# Patient Record
Sex: FEMALE | Race: WHITE | Employment: FULL TIME | URBAN - METROPOLITAN AREA
[De-identification: names, ages, dates, MRNs, and addresses within clinical notes are randomized per-mention and may not be internally consistent; named-entity substitution may affect disease eponyms.]

---

## 2018-07-02 PROBLEM — F32.81 PRE-MENSTRUAL MOOD DISORDER: Status: ACTIVE | Noted: 2018-07-02

## 2019-01-09 ENCOUNTER — HOSPITAL ENCOUNTER (OUTPATIENT)
Dept: GENERAL RADIOLOGY | Age: 39
Discharge: HOME | End: 2019-01-09
Payer: COMMERCIAL

## 2019-01-09 DIAGNOSIS — M54.5 LOW BACK PAIN, UNSPECIFIED BACK PAIN LATERALITY, UNSPECIFIED CHRONICITY, WITH SCIATICA PRESENCE UNSPECIFIED: ICD-10-CM

## 2019-01-09 DIAGNOSIS — M54.2 NECK PAIN: ICD-10-CM

## 2019-01-09 PROCEDURE — 72040 X-RAY EXAM NECK SPINE 2-3 VW: CPT

## 2019-01-09 PROCEDURE — 72100 X-RAY EXAM L-S SPINE 2/3 VWS: CPT

## 2019-01-10 NOTE — PROGRESS NOTES
Further imaging is indicated and orthopedic evaluation due to concern about symptoms. Treatment for spondylolysis is what we are already doing along with avoiding high impact activities. See telephone visit for details about these test results.

## 2020-01-20 PROBLEM — N83.201 BILATERAL OVARIAN CYSTS: Status: ACTIVE | Noted: 2020-01-20

## 2020-01-20 PROBLEM — N83.202 BILATERAL OVARIAN CYSTS: Status: ACTIVE | Noted: 2020-01-20

## 2020-01-20 PROBLEM — M47.812 SPONDYLOSIS OF CERVICAL REGION WITHOUT MYELOPATHY OR RADICULOPATHY: Status: ACTIVE | Noted: 2020-01-20

## 2020-12-03 ENCOUNTER — TRANSCRIBE ORDER (OUTPATIENT)
Dept: SCHEDULING | Age: 40
End: 2020-12-03

## 2020-12-03 DIAGNOSIS — Z12.31 SCREENING MAMMOGRAM, ENCOUNTER FOR: Primary | ICD-10-CM

## 2020-12-22 ENCOUNTER — HOSPITAL ENCOUNTER (OUTPATIENT)
Dept: MAMMOGRAPHY | Age: 40
Discharge: HOME | End: 2020-12-22
Attending: OBSTETRICS & GYNECOLOGY
Payer: COMMERCIAL

## 2020-12-22 DIAGNOSIS — Z12.31 SCREENING MAMMOGRAM, ENCOUNTER FOR: ICD-10-CM

## 2020-12-22 PROCEDURE — 77067 SCR MAMMO BI INCL CAD: CPT

## 2020-12-30 ENCOUNTER — HOSPITAL ENCOUNTER (OUTPATIENT)
Dept: MAMMOGRAPHY | Age: 40
Discharge: HOME | End: 2020-12-30
Attending: OBSTETRICS & GYNECOLOGY
Payer: COMMERCIAL

## 2020-12-30 DIAGNOSIS — R92.8 ABNORMAL SCREENING MAMMOGRAM: ICD-10-CM

## 2020-12-30 PROCEDURE — 76642 ULTRASOUND BREAST LIMITED: CPT

## 2020-12-30 PROCEDURE — 77065 DX MAMMO INCL CAD UNI: CPT

## 2021-01-06 ENCOUNTER — HOSPITAL ENCOUNTER (OUTPATIENT)
Dept: MRI IMAGING | Age: 41
Discharge: HOME | End: 2021-01-06
Attending: OBSTETRICS & GYNECOLOGY
Payer: COMMERCIAL

## 2021-01-06 DIAGNOSIS — Z80.3 FAMILY HISTORY OF BREAST CANCER: ICD-10-CM

## 2021-01-06 DIAGNOSIS — R92.8 ABNORMAL MAMMOGRAM: ICD-10-CM

## 2021-01-06 DIAGNOSIS — D24.9 FIBROADENOMA OF BREAST: ICD-10-CM

## 2021-01-06 DIAGNOSIS — R92.8 ABNORMAL ULTRASOUND OF BREAST: ICD-10-CM

## 2021-01-06 PROCEDURE — 74011250636 HC RX REV CODE- 250/636

## 2021-01-06 PROCEDURE — A9575 INJ GADOTERATE MEGLUMI 0.1ML: HCPCS

## 2021-01-06 PROCEDURE — C8937 CAD BREAST MRI: HCPCS

## 2021-01-06 RX ORDER — GADOTERATE MEGLUMINE 376.9 MG/ML
10 INJECTION INTRAVENOUS
Status: COMPLETED | OUTPATIENT
Start: 2021-01-06 | End: 2021-01-06

## 2021-01-06 RX ORDER — SODIUM CHLORIDE 0.9 % (FLUSH) 0.9 %
10 SYRINGE (ML) INJECTION
Status: COMPLETED | OUTPATIENT
Start: 2021-01-06 | End: 2021-01-06

## 2021-01-06 RX ADMIN — GADOTERATE MEGLUMINE 10 ML: 376.9 INJECTION INTRAVENOUS at 11:45

## 2021-01-06 RX ADMIN — Medication 10 ML: at 11:45

## 2021-01-07 ENCOUNTER — HOSPITAL ENCOUNTER (OUTPATIENT)
Dept: MAMMOGRAPHY | Age: 41
Discharge: HOME | End: 2021-01-07
Payer: COMMERCIAL

## 2021-01-07 VITALS — SYSTOLIC BLOOD PRESSURE: 118 MMHG | HEART RATE: 101 BPM | DIASTOLIC BLOOD PRESSURE: 76 MMHG

## 2021-01-07 DIAGNOSIS — R92.8 ABNORMAL MAMMOGRAM OF RIGHT BREAST: ICD-10-CM

## 2021-01-07 DIAGNOSIS — R93.89 ABNORMAL MRI: ICD-10-CM

## 2021-01-07 PROCEDURE — 77065 DX MAMMO INCL CAD UNI: CPT

## 2021-01-07 PROCEDURE — A4648 IMPLANTABLE TISSUE MARKER: HCPCS

## 2021-01-07 PROCEDURE — 74011000250 HC RX REV CODE- 250

## 2021-01-07 PROCEDURE — 88305 TISSUE EXAM BY PATHOLOGIST: CPT

## 2021-01-07 RX ORDER — LIDOCAINE HYDROCHLORIDE 10 MG/ML
9 INJECTION INFILTRATION; PERINEURAL
Status: COMPLETED | OUTPATIENT
Start: 2021-01-07 | End: 2021-01-07

## 2021-01-07 RX ADMIN — LIDOCAINE HYDROCHLORIDE 9 ML: 10 INJECTION, SOLUTION INFILTRATION; PERINEURAL at 08:47

## 2021-01-18 NOTE — H&P (VIEW-ONLY)
Amina Briceno, DO 
[address], [address] [facility] Phone [phone]   Fax [phone] Date of visit: 2021 Primary/Requesting provider: Jocy Flanagan MD 
 
Chief Complaint Patient presents with  New Patient  
  right breast papilloma Name: Della Crowder MRN: [id number] : 1980 Age: 38 y.o. Sex: female PCP: Jocy Flanagan MD  
 
 
CC:   
Chief Complaint Patient presents with  New Patient  
  right breast papilloma HPI: 
 
 Della Crowder is a 38 y.o. female who presents for evaluation of right breast intraductal papilloma. This is a healthy 79-year-old female with a biopsy proven right breast intraductal papilloma. The patient had breast MRI which revealed suspicious abnormality of the right breast leading to biopsy and diagnosis. She has had 2 previous lumpectomies in the right breast for fibroadenoma both performed through a areolar incision. She denies any previous personal history of breast cancer. She has family history of breast cancer in her mother. She is here today to discuss intraductal papilloma and possible surgery. Merlin Delacruz DIAGNOSIS  
A: \" RIGHT BREAST, 12:00 POSITION, 6 CM FROM NIPPLE, CORE BIOPSY\":  
FIBROCYSTIC MASTOPATHY HAVING INTRADUCTAL PAPILLOMA, MODERATE INTRADUCTAL HYPERPLASIA, APOCRINE METAPLASIA AND MICROCALCIFICATIONS  
jtl/2021 Electronically signed out on 2021 06:24 by CESAR Godoy M.D.  
 
[facility], 2021. CLINICAL HISTORY:  Abnormal mammogram and ultrasound. History of right breast 
fibroadenoma with indicated surgical removal in the patient's current provided 
breast surgical history. 
  
Technique: Multiplanar multisequence imaging of the breast were performed prior 
to and following the uneventful intravenous administration of 10 mL of Dotarem. Postcontrast imaging was performed using a dynamic technique. Images were 
reviewed using the [employer] software package.  
  
Sequences obtained: Axial T1 with and without fat saturation, axial STIR, and 
dynamic axial postcontrast fat-saturated T1-weighted images. Additional 
subtraction images, maximum intensity projected images, and sagittal 
reconstructed images were made from postcontrast images. 
  
Comparison studies: Bilateral mammogram 12/22/2020, and right breast diagnostic 
mammogram and ultrasound 12/30/2020. FINDINGS:  
The breasts are composed of marked fibroglandular elements. No clearly 
concerning axillary lymph node is seen. No enlarged internal mammary lymph nodes 
are seen. Evaluation of the lungs is limited by  MRI imaging. However, no 
obvious pulmonary masses are seen. No significant pleural effusions are seen. The liver is obscured by cardiac motion artifact and only partially visualized. However, no focal signal abnormalities are seen prior to, or following 
administration of contrast to suggest a possible hepatic lesion. Following the administration of intravenous contrast, normal enhancement is seen 
of the heart and vascular structures of the breasts. Severe background 
physiologic enhancement is seen in the bilateral breasts which could limit 
detection particularly of a more subtle process. Patient has a known abnormality 
in the upper right breast. Corresponding changes are seen at the 12:00 position 
of the right breast located 6 cm from the nipple. At this level, there is 
masslike enhancement which measures 1.4 cm wide by 0.8 cm AP by 1.4 cm 
craniocaudal. This is an irregular enhancing mass with spiculated margins and 
demonstrates predominantly plateau kinetics. A malignant lesion cannot be 
excluded given these features. No worrisome dominant enhancing mass or 
asymmetric nonmass-like enhancement is otherwise seen. No evidence of skin thickening, or nipple retraction is seen. No enhancing osseous lesion is seen. 
  
IMPRESSION IMPRESSION: 
1.  1.4 cm x 0.8 cm x 1.4 cm enhancing mass corresponding to abnormal mammogram 
changes located at the 12:00 position of the right breast located 6 cm from the 
nipple which demonstrates multiple concerning features by breast MRI with a 
malignant lesion not excluded. Further evaluation with second look ultrasound, 
and ultrasound-guided biopsy is recommended. 
  
BI-RADS Assessment Category 4: Suspicious Finding- Biopsy should be considered. 
  
PMH: 
 
Past Medical History:  
Diagnosis Date  Allergic rhinitis 10/24/2016  Anxiety  Bilateral ovarian cysts 2020  Chronic superficial gastritis without bleeding 2016 - EGD - gastritis moderate gastritis in the body -biopsied. Dr. Saray Sanches  Claustrophobia  Gastroesophageal reflux disease 10/24/2016  
 Hiatal hernia   
 per pt report found as a child  Pre-menstrual mood disorder 2018  Spondylosis of cervical region without myelopathy or radiculopathy 2020 MRI cervical spine  - POA showed some degenerative disc disease and osteophytes with mild/moderate spinal stenosis PSH: 
 
Past Surgical History:  
Procedure Laterality Date  HX BREAST BIOPSY Right 2021  HX BREAST LUMPECTOMY Right   
 fibroadenoma  HX  SECTION    
 2  
 
 
MEDS: 
 
Current Outpatient Medications Medication Sig  
 lansoprazole (Prevacid) 30 mg capsule Take 1 Cap by mouth Daily (before breakfast). Replaces famotidine  calcium carbonate (TUMS PO) Take  by mouth.  acetaminophen (TylenoL) 325 mg tablet Take  by mouth every four (4) hours as needed for Pain.  OTHER Marshmallow Root PRN  
 nystatin (MYCOSTATIN) topical cream Apply  to affected area two (2) times a day.  sodium chloride (OCEAN) 0.65 % nasal squeeze bottle 0.05 mL by Both Nostrils route four (4) times daily as needed for Congestion.  cyclobenzaprine (FLEXERIL) 10 mg tablet Take 1 Tab by mouth three (3) times daily as needed for Muscle Spasm(s). No current facility-administered medications for this visit. ALLERGIES:   
 
Allergies Allergen Reactions  Penicillin G Other (comments) As a child and has taken as an adult without difficulty  Sulfa (Sulfonamide Antibiotics) Other (comments) Hives. Face and throat swelling SH: Social History Tobacco Use  Smoking status: Former Smoker Packs/day: 0.50 Start date:  Quit date:  Years since quittin.0  Smokeless tobacco: Never Used Substance Use Topics  Alcohol use: Yes Comment: occasional  
 Drug use: No  
 
 
FH: 
 
Family History Problem Relation Age of Onset  Breast Cancer Mother  Kidney Disease Mother  Hypertension Mother  Asthma Mother  Neuropathy Mother  Cancer Mother   
     renal cell  Pacemaker Father  Hypertension Father  Cancer Father  Other Sister  Thyroid Disease Sister  Drug Abuse Brother  Asthma Brother  Glaucoma Maternal Grandmother  Cancer Maternal Grandmother  Hypertension Maternal Grandmother  Colon Cancer Maternal Grandfather  Stroke Paternal Grandmother  Heart Disease Paternal Grandmother  Heart Disease Paternal Grandfather Review of Systems Constitutional: Negative. HENT: Negative. Eyes: Negative. Respiratory: Negative. Cardiovascular: Negative. Gastrointestinal: Negative. Genitourinary: Negative. Musculoskeletal: Negative. Skin:  
     Right breast papilloma Neurological: Negative. Endo/Heme/Allergies: Negative. Psychiatric/Behavioral: Negative. Physical Exam:  
 
Visit Vitals /80 Pulse 88 Ht 5' 1\" (1.549 m) Wt 125 lb (56.7 kg) LMP 01/08/2021 BMI 23.62 kg/m² Physical Exam 
HENT:  
   Head: Normocephalic and atraumatic. Eyes:  
   Pupils: Pupils are equal, round, and reactive to light. Neck: Musculoskeletal: Normal range of motion and neck supple. Thyroid: No thyromegaly. Trachea: No tracheal deviation. Cardiovascular:  
   Rate and Rhythm: Normal rate and regular rhythm. Heart sounds: Normal heart sounds. No murmur. Pulmonary:  
   Effort: Pulmonary effort is normal. No respiratory distress. Breath sounds: Normal breath sounds. No wheezing or rales. Chest:  
 
 
Abdominal:  
   General: Bowel sounds are normal. There is no distension. Palpations: Abdomen is soft. There is no mass. Tenderness: There is no abdominal tenderness. There is no guarding or rebound. Musculoskeletal: Normal range of motion. Skin: 
   General: Skin is warm and dry. Findings: No erythema. Neurological:  
   Mental Status: She is alert and oriented to person, place, and time. Psychiatric:     
   Mood and Affect: Mood normal.     
   Judgment: Judgment normal.  
 
 
 
Assessment/Plan:  Jess Ngo is a 38 y.o. female who has signs and symptoms consistent with right breast intraductal papilloma. Today recommended needle localization partial mastectomy for margins. She will be scheduled the next available date and time. ICD-10-CM ICD-9-CM 1. Intraductal papilloma of breast, right  D24.1 217 I went through the risks of bleeding, infection and anesthesia. Counseling time:counseling time more than 50% of visit: 1 hour was utilized in office visit today 50% times in face-to-face discussion explaining intraductal papilloma. We discussed the anatomy of the breast followed by intraductal papilloma and how they commonly present. We discussed the rationale for partial mastectomy after diagnosis of intraductal papilloma to rule out papillary carcinoma. We discussed the details of the procedure the postoperative expectations and recovery time.  
 
Signed: Abdifatah Alegria DO  
1/18/2021  9:30 AM

## 2021-01-19 ENCOUNTER — HOSPITAL ENCOUNTER (OUTPATIENT)
Dept: SURGERY | Age: 41
Discharge: HOME | End: 2021-01-19

## 2021-01-21 VITALS — BODY MASS INDEX: 22.82 KG/M2 | WEIGHT: 124 LBS | HEIGHT: 62 IN

## 2021-01-21 RX ORDER — MELATONIN
DAILY
COMMUNITY

## 2021-01-21 RX ORDER — MULTIVIT WITH MINERALS/HERBS
1 TABLET ORAL DAILY
COMMUNITY

## 2021-01-21 NOTE — PERIOP NOTES
Patient verified name and . Order for consent NOT found in EHR; patient verifies procedure. Type 1B surgery, PAT PHONE assessment complete. Orders NOT received. Labs per surgeon: No orders received at this time. Labs per anesthesia protocol: None. Patient COVID test negative on 21. Patient answered medical/surgical history questions at their best of ability. All prior to admission medications documented in [facility]. Patient instructed to take the following medications the day of surgery according to anesthesia guidelines with a small sip of water: Prevacid and Flexeril PRN. Hold all vitamins, supplements, herbals, NSAIDs, and ASA stop NOW. Patient instructed on the following:      > 1 medical  is allowed at this time. > Arrive at [facility], time of arrival to be called the day before by 1700  > NPO after midnight including gum, mints, and ice chips  > Responsible adult must drive patient to the hospital, stay during surgery, and patient will need supervision 24 hours after anesthesia  > Use anti-bacterial soap in shower the night before surgery and on the morning of surgery  > All piercings must be removed prior to arrival.    > Leave all valuables (money and jewelry) at home but bring insurance card and ID on DOS.   > Do not wear make-up, nail polish, lotions, cologne, perfumes, powders, or oil on skin.

## 2021-01-24 ENCOUNTER — ANESTHESIA EVENT (OUTPATIENT)
Dept: SURGERY | Age: 41
End: 2021-01-24
Payer: COMMERCIAL

## 2021-01-25 ENCOUNTER — APPOINTMENT (OUTPATIENT)
Dept: MAMMOGRAPHY | Age: 41
End: 2021-01-25
Attending: SURGERY
Payer: COMMERCIAL

## 2021-01-25 ENCOUNTER — ANESTHESIA (OUTPATIENT)
Dept: SURGERY | Age: 41
End: 2021-01-25
Payer: COMMERCIAL

## 2021-01-25 ENCOUNTER — HOSPITAL ENCOUNTER (OUTPATIENT)
Age: 41
Setting detail: OUTPATIENT SURGERY
Discharge: HOME | End: 2021-01-25
Attending: SURGERY | Admitting: SURGERY
Payer: COMMERCIAL

## 2021-01-25 VITALS
BODY MASS INDEX: 22.65 KG/M2 | SYSTOLIC BLOOD PRESSURE: 109 MMHG | HEART RATE: 103 BPM | RESPIRATION RATE: 16 BRPM | DIASTOLIC BLOOD PRESSURE: 70 MMHG | TEMPERATURE: 98.4 F | HEIGHT: 62 IN | OXYGEN SATURATION: 98 % | WEIGHT: 123.1 LBS

## 2021-01-25 DIAGNOSIS — N63.10 MASS OF RIGHT BREAST: ICD-10-CM

## 2021-01-25 DIAGNOSIS — D24.1 INTRADUCTAL PAPILLOMA OF BREAST, RIGHT: ICD-10-CM

## 2021-01-25 LAB — HCG UR QL: NEGATIVE

## 2021-01-25 PROCEDURE — 74011000250 HC RX REV CODE- 250: Performed by: SURGERY

## 2021-01-25 PROCEDURE — 77030041445 HC PENCL ELECT SMK EVAC STRY -B: Performed by: SURGERY

## 2021-01-25 PROCEDURE — 74011250636 HC RX REV CODE- 250/636: Performed by: SURGERY

## 2021-01-25 PROCEDURE — 77030002933 HC SUT MCRYL J&J -A: Performed by: SURGERY

## 2021-01-25 PROCEDURE — 74011250637 HC RX REV CODE- 250/637: Performed by: ANESTHESIOLOGY

## 2021-01-25 PROCEDURE — 77030040922 HC BLNKT HYPOTHRM STRY -A: Performed by: ANESTHESIOLOGY

## 2021-01-25 PROCEDURE — 77030039425 HC BLD LARYNG TRULITE DISP TELE -A: Performed by: ANESTHESIOLOGY

## 2021-01-25 PROCEDURE — 77030040361 HC SLV COMPR DVT MDII -B: Performed by: SURGERY

## 2021-01-25 PROCEDURE — 74011250636 HC RX REV CODE- 250/636: Performed by: ANESTHESIOLOGY

## 2021-01-25 PROCEDURE — 2709999900 HC NON-CHARGEABLE SUPPLY: Performed by: SURGERY

## 2021-01-25 PROCEDURE — 77065 DX MAMMO INCL CAD UNI: CPT

## 2021-01-25 PROCEDURE — 77030031139 HC SUT VCRL2 J&J -A: Performed by: SURGERY

## 2021-01-25 PROCEDURE — 81025 URINE PREGNANCY TEST: CPT

## 2021-01-25 PROCEDURE — 76060000032 HC ANESTHESIA 0.5 TO 1 HR: Performed by: SURGERY

## 2021-01-25 PROCEDURE — 76210000020 HC REC RM PH II FIRST 0.5 HR: Performed by: SURGERY

## 2021-01-25 PROCEDURE — 77030037088 HC TUBE ENDOTRACH ORAL NSL COVD-A: Performed by: ANESTHESIOLOGY

## 2021-01-25 PROCEDURE — 74011000250 HC RX REV CODE- 250: Performed by: NURSE ANESTHETIST, CERTIFIED REGISTERED

## 2021-01-25 PROCEDURE — 76210000006 HC OR PH I REC 0.5 TO 1 HR: Performed by: SURGERY

## 2021-01-25 PROCEDURE — 76010000160 HC OR TIME 0.5 TO 1 HR INTENSV-TIER 1: Performed by: SURGERY

## 2021-01-25 PROCEDURE — 19301 PARTIAL MASTECTOMY: CPT | Performed by: SURGERY

## 2021-01-25 PROCEDURE — 74011250636 HC RX REV CODE- 250/636: Performed by: NURSE ANESTHETIST, CERTIFIED REGISTERED

## 2021-01-25 PROCEDURE — 19285 PERQ DEV BREAST 1ST US IMAG: CPT

## 2021-01-25 PROCEDURE — 88307 TISSUE EXAM BY PATHOLOGIST: CPT

## 2021-01-25 PROCEDURE — 77030002996 HC SUT SLK J&J -A: Performed by: SURGERY

## 2021-01-25 PROCEDURE — 77030010512 HC APPL CLP LIG J&J -C: Performed by: SURGERY

## 2021-01-25 RX ORDER — ROCURONIUM BROMIDE 10 MG/ML
INJECTION, SOLUTION INTRAVENOUS AS NEEDED
Status: DISCONTINUED | OUTPATIENT
Start: 2021-01-25 | End: 2021-01-25 | Stop reason: HOSPADM

## 2021-01-25 RX ORDER — LIDOCAINE HYDROCHLORIDE 10 MG/ML
5 INJECTION INFILTRATION; PERINEURAL
Status: COMPLETED | OUTPATIENT
Start: 2021-01-25 | End: 2021-01-25

## 2021-01-25 RX ORDER — SODIUM CHLORIDE, SODIUM LACTATE, POTASSIUM CHLORIDE, CALCIUM CHLORIDE 600; 310; 30; 20 MG/100ML; MG/100ML; MG/100ML; MG/100ML
75 INJECTION, SOLUTION INTRAVENOUS CONTINUOUS
Status: DISCONTINUED | OUTPATIENT
Start: 2021-01-25 | End: 2021-01-25 | Stop reason: HOSPADM

## 2021-01-25 RX ORDER — FENTANYL CITRATE 50 UG/ML
INJECTION, SOLUTION INTRAMUSCULAR; INTRAVENOUS AS NEEDED
Status: DISCONTINUED | OUTPATIENT
Start: 2021-01-25 | End: 2021-01-25 | Stop reason: HOSPADM

## 2021-01-25 RX ORDER — OXYCODONE AND ACETAMINOPHEN 5; 325 MG/1; MG/1
1 TABLET ORAL
Qty: 20 TAB | Refills: 0 | Status: SHIPPED | OUTPATIENT
Start: 2021-01-25 | End: 2021-01-30

## 2021-01-25 RX ORDER — PROPOFOL 10 MG/ML
INJECTION, EMULSION INTRAVENOUS AS NEEDED
Status: DISCONTINUED | OUTPATIENT
Start: 2021-01-25 | End: 2021-01-25 | Stop reason: HOSPADM

## 2021-01-25 RX ORDER — MIDAZOLAM HYDROCHLORIDE 1 MG/ML
2 INJECTION, SOLUTION INTRAMUSCULAR; INTRAVENOUS ONCE
Status: COMPLETED | OUTPATIENT
Start: 2021-01-25 | End: 2021-01-25

## 2021-01-25 RX ORDER — OXYCODONE HYDROCHLORIDE 5 MG/1
5 TABLET ORAL
Status: DISCONTINUED | OUTPATIENT
Start: 2021-01-25 | End: 2021-01-25 | Stop reason: HOSPADM

## 2021-01-25 RX ORDER — SUCCINYLCHOLINE CHLORIDE 20 MG/ML
INJECTION INTRAMUSCULAR; INTRAVENOUS AS NEEDED
Status: DISCONTINUED | OUTPATIENT
Start: 2021-01-25 | End: 2021-01-25 | Stop reason: HOSPADM

## 2021-01-25 RX ORDER — HYDROMORPHONE HYDROCHLORIDE 2 MG/ML
0.5 INJECTION, SOLUTION INTRAMUSCULAR; INTRAVENOUS; SUBCUTANEOUS
Status: DISCONTINUED | OUTPATIENT
Start: 2021-01-25 | End: 2021-01-25 | Stop reason: HOSPADM

## 2021-01-25 RX ORDER — ACETAMINOPHEN 500 MG
1000 TABLET ORAL ONCE
Status: COMPLETED | OUTPATIENT
Start: 2021-01-25 | End: 2021-01-25

## 2021-01-25 RX ORDER — LIDOCAINE HYDROCHLORIDE 20 MG/ML
INJECTION, SOLUTION EPIDURAL; INFILTRATION; INTRACAUDAL; PERINEURAL AS NEEDED
Status: DISCONTINUED | OUTPATIENT
Start: 2021-01-25 | End: 2021-01-25 | Stop reason: HOSPADM

## 2021-01-25 RX ORDER — LIDOCAINE HYDROCHLORIDE AND EPINEPHRINE 10; 10 MG/ML; UG/ML
INJECTION, SOLUTION INFILTRATION; PERINEURAL AS NEEDED
Status: DISCONTINUED | OUTPATIENT
Start: 2021-01-25 | End: 2021-01-25 | Stop reason: HOSPADM

## 2021-01-25 RX ORDER — OXYCODONE AND ACETAMINOPHEN 10; 325 MG/1; MG/1
1 TABLET ORAL AS NEEDED
Status: DISCONTINUED | OUTPATIENT
Start: 2021-01-25 | End: 2021-01-25 | Stop reason: HOSPADM

## 2021-01-25 RX ORDER — DEXAMETHASONE SODIUM PHOSPHATE 4 MG/ML
INJECTION, SOLUTION INTRA-ARTICULAR; INTRALESIONAL; INTRAMUSCULAR; INTRAVENOUS; SOFT TISSUE AS NEEDED
Status: DISCONTINUED | OUTPATIENT
Start: 2021-01-25 | End: 2021-01-25 | Stop reason: HOSPADM

## 2021-01-25 RX ORDER — KETOROLAC TROMETHAMINE 30 MG/ML
30 INJECTION, SOLUTION INTRAMUSCULAR; INTRAVENOUS
Status: COMPLETED | OUTPATIENT
Start: 2021-01-25 | End: 2021-01-25

## 2021-01-25 RX ORDER — SODIUM CHLORIDE 0.9 % (FLUSH) 0.9 %
5-40 SYRINGE (ML) INJECTION AS NEEDED
Status: DISCONTINUED | OUTPATIENT
Start: 2021-01-25 | End: 2021-01-25 | Stop reason: HOSPADM

## 2021-01-25 RX ORDER — ONDANSETRON 2 MG/ML
INJECTION INTRAMUSCULAR; INTRAVENOUS AS NEEDED
Status: DISCONTINUED | OUTPATIENT
Start: 2021-01-25 | End: 2021-01-25 | Stop reason: HOSPADM

## 2021-01-25 RX ORDER — SODIUM CHLORIDE 0.9 % (FLUSH) 0.9 %
5-40 SYRINGE (ML) INJECTION EVERY 8 HOURS
Status: DISCONTINUED | OUTPATIENT
Start: 2021-01-25 | End: 2021-01-25 | Stop reason: HOSPADM

## 2021-01-25 RX ORDER — CLINDAMYCIN PHOSPHATE 900 MG/50ML
900 INJECTION INTRAVENOUS ONCE
Status: COMPLETED | OUTPATIENT
Start: 2021-01-25 | End: 2021-01-25

## 2021-01-25 RX ADMIN — ONDANSETRON 4 MG: 2 INJECTION INTRAMUSCULAR; INTRAVENOUS at 13:32

## 2021-01-25 RX ADMIN — MIDAZOLAM 2 MG: 1 INJECTION INTRAMUSCULAR; INTRAVENOUS at 12:55

## 2021-01-25 RX ADMIN — SUCCINYLCHOLINE CHLORIDE 120 MG: 20 INJECTION, SOLUTION INTRAMUSCULAR; INTRAVENOUS at 13:09

## 2021-01-25 RX ADMIN — ROCURONIUM BROMIDE 5 MG: 10 INJECTION, SOLUTION INTRAVENOUS at 13:09

## 2021-01-25 RX ADMIN — DEXAMETHASONE SODIUM PHOSPHATE 8 MG: 4 INJECTION, SOLUTION INTRAMUSCULAR; INTRAVENOUS at 13:32

## 2021-01-25 RX ADMIN — KETOROLAC TROMETHAMINE 30 MG: 30 INJECTION, SOLUTION INTRAMUSCULAR at 14:26

## 2021-01-25 RX ADMIN — ACETAMINOPHEN 1000 MG: 500 TABLET ORAL at 09:55

## 2021-01-25 RX ADMIN — SODIUM CHLORIDE, SODIUM LACTATE, POTASSIUM CHLORIDE, AND CALCIUM CHLORIDE: 600; 310; 30; 20 INJECTION, SOLUTION INTRAVENOUS at 13:00

## 2021-01-25 RX ADMIN — SODIUM CHLORIDE, SODIUM LACTATE, POTASSIUM CHLORIDE, AND CALCIUM CHLORIDE: 600; 310; 30; 20 INJECTION, SOLUTION INTRAVENOUS at 13:27

## 2021-01-25 RX ADMIN — FENTANYL CITRATE 100 MCG: 50 INJECTION INTRAMUSCULAR; INTRAVENOUS at 13:09

## 2021-01-25 RX ADMIN — CLINDAMYCIN PHOSPHATE 900 MG: 900 INJECTION, SOLUTION INTRAVENOUS at 13:00

## 2021-01-25 RX ADMIN — LIDOCAINE HYDROCHLORIDE 100 MG: 20 INJECTION, SOLUTION EPIDURAL; INFILTRATION; INTRACAUDAL; PERINEURAL at 13:09

## 2021-01-25 RX ADMIN — SODIUM CHLORIDE, SODIUM LACTATE, POTASSIUM CHLORIDE, AND CALCIUM CHLORIDE 75 ML/HR: 600; 310; 30; 20 INJECTION, SOLUTION INTRAVENOUS at 09:49

## 2021-01-25 RX ADMIN — PROPOFOL 200 MG: 10 INJECTION, EMULSION INTRAVENOUS at 13:09

## 2021-01-25 RX ADMIN — LIDOCAINE HYDROCHLORIDE 5 ML: 10 INJECTION, SOLUTION INFILTRATION; PERINEURAL at 10:44

## 2021-01-25 NOTE — ANESTHESIA PREPROCEDURE EVALUATION
Relevant Problems   NEUROLOGY   (+) Pre-menstrual mood disorder      GASTROINTESTINAL   (+) Gastroesophageal reflux disease       Anesthetic History   No history of anesthetic complications            Review of Systems / Medical History  Patient summary reviewed and pertinent labs reviewed    Pulmonary  Within defined limits                 Neuro/Psych         Neuromuscular disease and psychiatric history    Comments: Mild cervical spondylosis on MRI Cardiovascular                  Exercise tolerance: >4 METS     GI/Hepatic/Renal     GERD: well controlled      PUD     Endo/Other        Arthritis     Other Findings            Physical Exam    Airway  Mallampati: II  TM Distance: > 6 cm  Neck ROM: normal range of motion   Mouth opening: Normal     Cardiovascular    Rhythm: regular           Dental  No notable dental hx       Pulmonary                 Abdominal  GI exam deferred       Other Findings            Anesthetic Plan    ASA: 2  Anesthesia type: general          Induction: Intravenous  Anesthetic plan and risks discussed with: Patient

## 2021-01-25 NOTE — ANESTHESIA POSTPROCEDURE EVALUATION
Procedure(s):  RIGHT BREAST PARTIAL MASTECTOMY  RIGHT BREAST NEEDLE LOCALIZED BIOPSY PREOP 0830/ LOC 1000/ SURGERY 1200.     general    Anesthesia Post Evaluation      Multimodal analgesia: multimodal analgesia not used between 6 hours prior to anesthesia start to PACU discharge  Patient location during evaluation: PACU  Patient participation: complete - patient participated  Level of consciousness: awake  Pain management: adequate  Airway patency: patent  Anesthetic complications: no  Cardiovascular status: acceptable  Respiratory status: acceptable  Hydration status: acceptable  Post anesthesia nausea and vomiting:  none  Final Post Anesthesia Temperature Assessment:  Normothermia (36.0-37.5 degrees C)      INITIAL Post-op Vital signs:   Vitals Value Taken Time   /70 01/25/21 1425   Temp 36.9 °C (98.4 °F) 01/25/21 1358   Pulse 103 01/25/21 1425   Resp 16 01/25/21 1425   SpO2 98 % 01/25/21 1425

## 2021-01-25 NOTE — DISCHARGE INSTRUCTIONS
Post op instructions:  1. May shower only, no tub bathing, no hot tub, no swimming. 2. Keep incision clean with regular soap and water. 3. No lifting over 10 lbs. 4. Regular diet as tolerated. 5. May remove topical dressing on Day #2. Leave steri strips until seen in Dr. Alegria's office at follow up appointment. 6. No driving on pain medicines. 7. Resume home medicines as usual.   Esperanza Jones, DO      After general anesthesia or intravenous sedation, for 24 hours or while taking prescription Narcotics:  · Limit your activities  · A responsible adult needs to be with you for the next 24 hours  · Do not drive and operate hazardous machinery  · Do not make important personal or business decisions  · Do not drink alcoholic beverages  · If you have not urinated within 8 hours after discharge, please contact your surgeon on call. · If you have sleep apnea and have a CPAP machine, please use it for all naps and sleeping. · Please use caution when taking narcotics and any of your home medications that may cause drowsiness. *  Please give a list of your current medications to your Primary Care Provider. *  Please update this list whenever your medications are discontinued, doses are      changed, or new medications (including over-the-counter products) are added. *  Please carry medication information at all times in case of emergency situations. These are general instructions for a healthy lifestyle:  No smoking/ No tobacco products/ Avoid exposure to second hand smoke  Surgeon General's Warning:  Quitting smoking now greatly reduces serious risk to your health.   Obesity, smoking, and sedentary lifestyle greatly increases your risk for illness  A healthy diet, regular physical exercise & weight monitoring are important for maintaining a healthy lifestyle    You may be retaining fluid if you have a history of heart failure or if you experience any of the following symptoms:  Weight gain of 3 pounds or more overnight or 5 pounds in a week, increased swelling in our hands or feet or shortness of breath while lying flat in bed. Please call your doctor as soon as you notice any of these symptoms; do not wait until your next office visit.

## 2021-01-25 NOTE — INTERVAL H&P NOTE
Update History & Physical    The Patient's History and Physical of January 18, 2021 was reviewed with the patient and I examined the patient. There was no change. The surgical site was confirmed by the patient and me. Plan:  The risk, benefits, expected outcome, and alternative to the recommended procedure have been discussed with the patient. Patient understands and wants to proceed with the procedure.     Electronically signed by Miguelangel Kerr DO on 1/25/2021 at 12:30 PM

## 2021-01-25 NOTE — BRIEF OP NOTE
Brief Postoperative Note    Patient: Viviana Anne  YOB: 1980  MRN: [id number]    Date of Procedure: 1/25/2021     Pre-Op Diagnosis: Intraductal papilloma of breast, right [D24.1]    Post-Op Diagnosis: Same as preoperative diagnosis. Procedure(s):  RIGHT BREAST NEEDLE LOCALIZED PARTIAL MASTECTOMY CPT 96729      Surgeon(s):  Isis Harrison DO    Surgical Assistant: None    Anesthesia: General     Estimated Blood Loss (mL): Minimal    Complications: None    Specimens:   ID Type Source Tests Collected by Time Destination   1 : Right Breast  Fresh Breast  Isis Harrison DO 1/25/2021 1327 Pathology        Implants: * No implants in log *    Drains: * No LDAs found *    Findings: Successful needle loc partial mastectomy with biopsy clip in center portion of the specimen.     Electronically Signed by Castillo Keller DO on 1/25/2021 at 1:53 PM  [id number]

## 2021-04-28 ENCOUNTER — HOSPITAL ENCOUNTER (OUTPATIENT)
Dept: LAB | Age: 41
Discharge: HOME | End: 2021-04-28

## 2021-04-28 PROCEDURE — 88312 SPECIAL STAINS GROUP 1: CPT

## 2021-04-28 PROCEDURE — 88305 TISSUE EXAM BY PATHOLOGIST: CPT

## 2022-02-21 ENCOUNTER — TRANSCRIBE ORDER (OUTPATIENT)
Dept: SCHEDULING | Age: 42
End: 2022-02-21

## 2022-02-21 DIAGNOSIS — Z12.31 SCREENING MAMMOGRAM FOR HIGH-RISK PATIENT: Primary | ICD-10-CM

## 2022-03-09 ENCOUNTER — HOSPITAL ENCOUNTER (OUTPATIENT)
Dept: MAMMOGRAPHY | Age: 42
Discharge: HOME | End: 2022-03-09
Attending: OBSTETRICS & GYNECOLOGY

## 2022-03-09 DIAGNOSIS — Z12.31 SCREENING MAMMOGRAM FOR HIGH-RISK PATIENT: ICD-10-CM

## 2022-03-18 PROBLEM — N83.201 BILATERAL OVARIAN CYSTS: Status: ACTIVE | Noted: 2020-01-20

## 2022-03-18 PROBLEM — F32.81 PRE-MENSTRUAL MOOD DISORDER: Status: ACTIVE | Noted: 2018-07-02

## 2022-03-18 PROBLEM — N83.202 BILATERAL OVARIAN CYSTS: Status: ACTIVE | Noted: 2020-01-20

## 2022-03-18 PROBLEM — D24.1 INTRADUCTAL PAPILLOMA OF BREAST, RIGHT: Status: ACTIVE | Noted: 2021-01-25

## 2022-03-19 PROBLEM — M47.812 SPONDYLOSIS OF CERVICAL REGION WITHOUT MYELOPATHY OR RADICULOPATHY: Status: ACTIVE | Noted: 2020-01-20

## 2022-03-24 ENCOUNTER — HOSPITAL ENCOUNTER (OUTPATIENT)
Dept: MAMMOGRAPHY | Age: 42
Discharge: HOME | End: 2022-03-24
Attending: OBSTETRICS & GYNECOLOGY
Payer: COMMERCIAL

## 2022-03-24 PROCEDURE — 77063 BREAST TOMOSYNTHESIS BI: CPT

## 2022-05-24 ENCOUNTER — TELEPHONE (OUTPATIENT)
Dept: ORTHOPEDIC SURGERY | Age: 42
End: 2022-05-24

## 2022-05-24 DIAGNOSIS — M47.812 SPONDYLOSIS OF CERVICAL REGION WITHOUT MYELOPATHY OR RADICULOPATHY: Primary | ICD-10-CM

## 2022-05-26 ENCOUNTER — TELEPHONE (OUTPATIENT)
Dept: ORTHOPEDIC SURGERY | Age: 42
End: 2022-05-26

## 2022-07-21 ENCOUNTER — PATIENT MESSAGE (OUTPATIENT)
Dept: ORTHOPEDIC SURGERY | Age: 42
End: 2022-07-21

## 2022-07-21 DIAGNOSIS — M47.812 SPONDYLOSIS OF CERVICAL REGION WITHOUT MYELOPATHY OR RADICULOPATHY: Primary | ICD-10-CM

## 2022-07-22 NOTE — TELEPHONE ENCOUNTER
From: Antonia Marie  To: Cruz Cathi  Sent: 7/21/2022 2:15 PM EDT  Subject: New PT    Good afternoon. I have been going to [facility] and have not seen much improvement in pain, only in strengthening- though I do like my PT very much. I have done a bit more research and found another PT that may be a good fit. He is, however, out-of-network, but may still be worth a try. It is Dr. Nicholette Epley at [facility]. He also treats TMJ issues, so that is a bonus. I hate to leave my current PT, but it seems like it may not be a good fit. His practice seems a bit more like [facility]. Do you think I should put [facility] on hold and at least try an initial evaluation and see what I think? If so, I may need to  another referral. Thank you for your input.

## 2022-08-03 ENCOUNTER — TELEPHONE (OUTPATIENT)
Dept: ORTHOPEDIC SURGERY | Age: 42
End: 2022-08-03

## 2022-09-26 SDOH — HEALTH STABILITY: PHYSICAL HEALTH: ON AVERAGE, HOW MANY MINUTES DO YOU ENGAGE IN EXERCISE AT THIS LEVEL?: 20 MIN

## 2022-09-26 SDOH — HEALTH STABILITY: PHYSICAL HEALTH: ON AVERAGE, HOW MANY DAYS PER WEEK DO YOU ENGAGE IN MODERATE TO STRENUOUS EXERCISE (LIKE A BRISK WALK)?: 3 DAYS

## 2022-09-26 ASSESSMENT — SOCIAL DETERMINANTS OF HEALTH (SDOH)
WITHIN THE LAST YEAR, HAVE YOU BEEN AFRAID OF YOUR PARTNER OR EX-PARTNER?: NO
WITHIN THE LAST YEAR, HAVE YOU BEEN KICKED, HIT, SLAPPED, OR OTHERWISE PHYSICALLY HURT BY YOUR PARTNER OR EX-PARTNER?: NO
WITHIN THE LAST YEAR, HAVE YOU BEEN HUMILIATED OR EMOTIONALLY ABUSED IN OTHER WAYS BY YOUR PARTNER OR EX-PARTNER?: NO
WITHIN THE LAST YEAR, HAVE TO BEEN RAPED OR FORCED TO HAVE ANY KIND OF SEXUAL ACTIVITY BY YOUR PARTNER OR EX-PARTNER?: NO

## 2022-09-27 ENCOUNTER — OFFICE VISIT (OUTPATIENT)
Dept: INTERNAL MEDICINE CLINIC | Facility: CLINIC | Age: 42
End: 2022-09-27
Payer: COMMERCIAL

## 2022-09-27 VITALS
WEIGHT: 120 LBS | BODY MASS INDEX: 22.08 KG/M2 | OXYGEN SATURATION: 97 % | DIASTOLIC BLOOD PRESSURE: 68 MMHG | HEIGHT: 62 IN | HEART RATE: 86 BPM | RESPIRATION RATE: 14 BRPM | TEMPERATURE: 99.4 F | SYSTOLIC BLOOD PRESSURE: 104 MMHG

## 2022-09-27 DIAGNOSIS — Z79.899 OTHER LONG TERM (CURRENT) DRUG THERAPY: ICD-10-CM

## 2022-09-27 DIAGNOSIS — R74.8 ELEVATED LIVER ENZYMES: Primary | ICD-10-CM

## 2022-09-27 DIAGNOSIS — K21.9 GASTROESOPHAGEAL REFLUX DISEASE WITHOUT ESOPHAGITIS: ICD-10-CM

## 2022-09-27 DIAGNOSIS — Z13.21 ENCOUNTER FOR VITAMIN DEFICIENCY SCREENING: ICD-10-CM

## 2022-09-27 DIAGNOSIS — R74.8 ELEVATED LIVER ENZYMES: ICD-10-CM

## 2022-09-27 DIAGNOSIS — Z13.220 ENCOUNTER FOR SCREENING FOR LIPID DISORDER: ICD-10-CM

## 2022-09-27 DIAGNOSIS — Z00.00 ANNUAL VISIT FOR GENERAL ADULT MEDICAL EXAMINATION WITHOUT ABNORMAL FINDINGS: ICD-10-CM

## 2022-09-27 PROBLEM — D24.1 INTRADUCTAL PAPILLOMA OF BREAST, RIGHT: Status: RESOLVED | Noted: 2021-01-25 | Resolved: 2022-09-27

## 2022-09-27 PROBLEM — N83.201 BILATERAL OVARIAN CYSTS: Status: RESOLVED | Noted: 2020-01-20 | Resolved: 2022-09-27

## 2022-09-27 PROBLEM — N83.202 BILATERAL OVARIAN CYSTS: Status: RESOLVED | Noted: 2020-01-20 | Resolved: 2022-09-27

## 2022-09-27 LAB
ALBUMIN SERPL-MCNC: 4.1 G/DL (ref 3.5–5)
ALBUMIN/GLOB SERPL: 1.6 {RATIO} (ref 1.2–3.5)
ALP SERPL-CCNC: 39 U/L (ref 50–136)
ALT SERPL-CCNC: 19 U/L (ref 12–65)
ANION GAP SERPL CALC-SCNC: 6 MMOL/L (ref 4–13)
AST SERPL-CCNC: 11 U/L (ref 15–37)
BASOPHILS # BLD: 0.1 K/UL (ref 0–0.2)
BASOPHILS NFR BLD: 1 % (ref 0–2)
BILIRUB DIRECT SERPL-MCNC: 0.3 MG/DL
BILIRUB SERPL-MCNC: 1.6 MG/DL (ref 0.2–1.1)
BUN SERPL-MCNC: 12 MG/DL (ref 6–23)
CALCIUM SERPL-MCNC: 9.2 MG/DL (ref 8.3–10.4)
CHLORIDE SERPL-SCNC: 104 MMOL/L (ref 101–110)
CHOLEST SERPL-MCNC: 157 MG/DL
CO2 SERPL-SCNC: 29 MMOL/L (ref 21–32)
CREAT SERPL-MCNC: 0.7 MG/DL (ref 0.6–1)
DIFFERENTIAL METHOD BLD: NORMAL
EOSINOPHIL # BLD: 0.3 K/UL (ref 0–0.8)
EOSINOPHIL NFR BLD: 4 % (ref 0.5–7.8)
ERYTHROCYTE [DISTWIDTH] IN BLOOD BY AUTOMATED COUNT: 11.9 % (ref 11.9–14.6)
GLOBULIN SER CALC-MCNC: 2.6 G/DL (ref 2.3–3.5)
GLUCOSE SERPL-MCNC: 99 MG/DL (ref 65–100)
HCT VFR BLD AUTO: 39 % (ref 35.8–46.3)
HDLC SERPL-MCNC: 58 MG/DL (ref 40–60)
HDLC SERPL: 2.7 {RATIO}
HGB BLD-MCNC: 12.8 G/DL (ref 11.7–15.4)
IMM GRANULOCYTES # BLD AUTO: 0.1 K/UL (ref 0–0.5)
IMM GRANULOCYTES NFR BLD AUTO: 1 % (ref 0–5)
LDLC SERPL CALC-MCNC: 91.2 MG/DL
LYMPHOCYTES # BLD: 1.5 K/UL (ref 0.5–4.6)
LYMPHOCYTES NFR BLD: 18 % (ref 13–44)
MCH RBC QN AUTO: 31.1 PG (ref 26.1–32.9)
MCHC RBC AUTO-ENTMCNC: 32.8 G/DL (ref 31.4–35)
MCV RBC AUTO: 94.9 FL (ref 79.6–97.8)
MONOCYTES # BLD: 0.5 K/UL (ref 0.1–1.3)
MONOCYTES NFR BLD: 6 % (ref 4–12)
NEUTS SEG # BLD: 5.9 K/UL (ref 1.7–8.2)
NEUTS SEG NFR BLD: 71 % (ref 43–78)
NRBC # BLD: 0 K/UL (ref 0–0.2)
PLATELET # BLD AUTO: 247 K/UL (ref 150–450)
PMV BLD AUTO: 10.6 FL (ref 9.4–12.3)
POTASSIUM SERPL-SCNC: 4.2 MMOL/L (ref 3.5–5.1)
PROT SERPL-MCNC: 6.7 G/DL (ref 6.3–8.2)
RBC # BLD AUTO: 4.11 M/UL (ref 4.05–5.2)
SODIUM SERPL-SCNC: 139 MMOL/L (ref 136–145)
TRIGL SERPL-MCNC: 39 MG/DL (ref 35–150)
VLDLC SERPL CALC-MCNC: 7.8 MG/DL (ref 6–23)
WBC # BLD AUTO: 8.3 K/UL (ref 4.3–11.1)

## 2022-09-27 PROCEDURE — 99215 OFFICE O/P EST HI 40 MIN: CPT | Performed by: INTERNAL MEDICINE

## 2022-09-27 RX ORDER — MAGNESIUM OXIDE 400 MG/1
400 TABLET ORAL DAILY
COMMUNITY

## 2022-09-27 ASSESSMENT — ENCOUNTER SYMPTOMS
BACK PAIN: 1
GASTROINTESTINAL NEGATIVE: 1
EYES NEGATIVE: 1
RESPIRATORY NEGATIVE: 1

## 2022-09-27 ASSESSMENT — PATIENT HEALTH QUESTIONNAIRE - PHQ9
7. TROUBLE CONCENTRATING ON THINGS, SUCH AS READING THE NEWSPAPER OR WATCHING TELEVISION: 0
10. IF YOU CHECKED OFF ANY PROBLEMS, HOW DIFFICULT HAVE THESE PROBLEMS MADE IT FOR YOU TO DO YOUR WORK, TAKE CARE OF THINGS AT HOME, OR GET ALONG WITH OTHER PEOPLE: 0
SUM OF ALL RESPONSES TO PHQ9 QUESTIONS 1 & 2: 0
SUM OF ALL RESPONSES TO PHQ QUESTIONS 1-9: 0
9. THOUGHTS THAT YOU WOULD BE BETTER OFF DEAD, OR OF HURTING YOURSELF: 0
8. MOVING OR SPEAKING SO SLOWLY THAT OTHER PEOPLE COULD HAVE NOTICED. OR THE OPPOSITE, BEING SO FIGETY OR RESTLESS THAT YOU HAVE BEEN MOVING AROUND A LOT MORE THAN USUAL: 0
2. FEELING DOWN, DEPRESSED OR HOPELESS: 0
SUM OF ALL RESPONSES TO PHQ QUESTIONS 1-9: 0
4. FEELING TIRED OR HAVING LITTLE ENERGY: 0
5. POOR APPETITE OR OVEREATING: 0
3. TROUBLE FALLING OR STAYING ASLEEP: 0
SUM OF ALL RESPONSES TO PHQ QUESTIONS 1-9: 0
SUM OF ALL RESPONSES TO PHQ QUESTIONS 1-9: 0
1. LITTLE INTEREST OR PLEASURE IN DOING THINGS: 0
6. FEELING BAD ABOUT YOURSELF - OR THAT YOU ARE A FAILURE OR HAVE LET YOURSELF OR YOUR FAMILY DOWN: 0

## 2022-09-27 NOTE — PROGRESS NOTES
Rima Mcgarry D.O. [facility]  [address], [place] [id number]  Tel: [phone]    History and Physical Office Visit     Patient Name: Cecelia Jordan    :  1980   MRN:   [id number]      Today's Date: 22 2:16 PM    Subjective     The patient is a 42y.o. year old female with a pmh as listed below. The patient presents today to [facility] care. She is not on any medications except for Flexeril for her back. She has history of two endoscopies for for gastritis. She has had RUQ pain in the past and has had a workup and an US which was negative. Family history of cancer: mother had breast CA and RCC and she survived both but  from [address], her PGF had colon cancer, her maternal grandmother had either ovarian or cervical.     Family history of heart disease/early onset MI: mother had a stroke during [facility], [address] [place] [address] Avenue had two strokes, father has afib and htn     Diet: she eats healthy   Exercise: she is getting better at it   Alcohol: glass a wine a night   Cigarettes: 12 years a pack a day, quit in   Sexually active: , with 2 girls   Occupation: teacher, special needs     Health Maintenance:  She sees Dr. Luzmaria Adkins last pap in 2022  Mammogram in     New complaints:  She is in PT for bulging disc disease and has a history of chronic low back pain. Review of Systems   Constitutional: Negative. HENT: Negative. Eyes: Negative. Respiratory: Negative. Cardiovascular: Negative. Gastrointestinal: Negative. Genitourinary: Negative. Musculoskeletal:  Positive for back pain. Skin: Negative. Neurological: Negative. Psychiatric/Behavioral: Negative.          Past Medical History:   Diagnosis Date    Allergic rhinitis 10/24/2016    Anxiety     Bilateral ovarian cysts 2020    Chronic superficial gastritis without bleeding 2016 - EGD - gastritis moderate gastritis in the body -biopsied.  Dr. Sondra Guadalupe     Gastroesophageal reflux disease 10/24/2016    Hiatal hernia     per pt report found as a child    Intraductal papilloma of breast, right 2021    Spondylosis of cervical region without myelopathy or radiculopathy 2020    MRI cervical spine  - POA showed some degenerative disc disease and osteophytes with mild/moderate spinal stenosis     Social History     Socioeconomic History    Marital status:      Spouse name: Not on file    Number of children: Not on file    Years of education: Not on file    Highest education level: Not on file   Occupational History    Not on file   Tobacco Use    Smoking status: Former     Packs/day: 1.00     Types: Cigarettes     Start date: 1998     Quit date: 2010     Years since quittin.7    Smokeless tobacco: Never   Substance and Sexual Activity    Alcohol use: Yes    Drug use: No    Sexual activity: Not on file   Other Topics Concern    Not on file   Social History Narrative    Lives with  and children  and      Social Determinants of Health     Financial Resource Strain: Not on file   Food Insecurity: Not on file   Transportation Needs: Not on file   Physical Activity: Insufficiently Active    Days of Exercise per Week: 3 days    Minutes of Exercise per Session: 20 min   Stress: Not on file   Social Connections: Not on file   Intimate Partner Violence: Not At Risk    Fear of Current or Ex-Partner: No    Emotionally Abused: No    Physically Abused: No    Sexually Abused: No   Housing Stability: Not on file         Current Outpatient Medications   Medication Sig    cyanocobalamin 1000 MCG tablet 1,000 mcg    B COMPLEX-C PO Take 1 tablet by mouth daily    magnesium oxide (MAG-OX) 400 MG tablet Take 400 mg by mouth daily    Digestive Enzymes (PAPAYA AND ENZYMES PO) Take by mouth    acetaminophen (TYLENOL) 325 MG tablet Take by mouth every 4 hours as needed    vitamin D (CHOLECALCIFEROL) 25 MCG (1000 UT) TABS tablet Take by mouth daily    cyclobenzaprine (FLEXERIL) 10 MG tablet Take 10 mg by mouth 3 times daily as needed     No current facility-administered medications for this visit. Objective     Vitals:    09/27/22 1345   BP: 104/68   Site: Left Upper Arm   Position: Sitting   Cuff Size: Large Adult   Pulse: 86   Resp: 14   Temp: 99.4 °F (37.4 °C)   TempSrc: Temporal   SpO2: 97%   Weight: 120 lb (54.4 kg)   Height: 5' 2\" (1.575 m)        Physical Exam:  Constitutional: Appears well kempt. Alert/oriented x3. In no acute distress. Head: Normocephalic No trauma. No deformity. No bruits. Neck: Supple. ROM normal. No tenderness. No masses. Eyes: PERRLA. Conjunctivae normal. No discharge. Ears: External ears normal. TM normal. No discharge from ears. Nose: Nose normal. Nares patent. Throat: Clear. No exudates. No erythema. Cardiac: Heart with normal rate/rhythm. No murmurs. No gallops. Pulses normal.   Pulmonary: Lungs clear to auscultation bilaterally. In no respiratory distress. No wheezing. No rales. No rhonci. Gastrointestinal: Bowel sounds present. Abdomen soft and nondistended. Musculoskeletal: Moves all extremities with good ROM. Non-tender. No swelling. No edema. Neurological: No numbness. No tingling. Alert and oriented. At baseline. No confusion. Patellar Reflexes 2+. Psychiatric: Normal thought content. Normal behavior. Normal judgment. Recommendations     Assessment:  Patient Active Problem List   Diagnosis    Gastroesophageal reflux disease without esophagitis    Spondylosis of cervical region without myelopathy or radiculopathy    Elevated liver enzymes      Status of Medical Conditions: stable     Plan:  -Patient is a 42year old female with no acute complaints today.  She is up-to-date on pap smear and mammogram. She has history of elevated liver enzymes and we will check them today.   -check lipid panel and basic labs  -continue current treatment plan as prescribed previously   -she will complete Hep B and shingles  -she will get her flushot on Friday at school     -Previous medical records and/or labs/tests available to me reviewed, any records outstanding not available requested  -The risks, benefits, and medical necessity of all medications, tests labs, and any other orders that were ordered at today's visit were discussed with the patient including all elective or patient requested orders. Decision to order or not order tests or based on this risk/benefit ratio and medical necessity.  -The patient expresses understanding of the plan as I've explained it to her and is in agreement with the current plan.     Follow up: 1 year, will need mammo    Total Time: 45 minutes (chart review and in-exam time)    Signed: Anaya Ceballos D.O.  09/27/22  2:16 PM

## 2022-09-28 LAB — 25(OH)D3 SERPL-MCNC: 28.5 NG/ML (ref 30–100)

## 2022-12-05 ENCOUNTER — OFFICE VISIT (OUTPATIENT)
Dept: INTERNAL MEDICINE CLINIC | Facility: CLINIC | Age: 42
End: 2022-12-05
Payer: COMMERCIAL

## 2022-12-05 VITALS
HEART RATE: 87 BPM | DIASTOLIC BLOOD PRESSURE: 77 MMHG | SYSTOLIC BLOOD PRESSURE: 113 MMHG | OXYGEN SATURATION: 99 % | HEIGHT: 62 IN | BODY MASS INDEX: 22.38 KG/M2 | TEMPERATURE: 98.6 F | WEIGHT: 121.6 LBS

## 2022-12-05 DIAGNOSIS — R10.11 RUQ ABDOMINAL PAIN: ICD-10-CM

## 2022-12-05 DIAGNOSIS — N30.00 ACUTE CYSTITIS WITHOUT HEMATURIA: ICD-10-CM

## 2022-12-05 DIAGNOSIS — R10.84 GENERALIZED ABDOMINAL PAIN: Primary | ICD-10-CM

## 2022-12-05 DIAGNOSIS — R10.84 GENERALIZED ABDOMINAL PAIN: ICD-10-CM

## 2022-12-05 LAB
ALBUMIN SERPL-MCNC: 4.3 G/DL (ref 3.5–5)
ALBUMIN/GLOB SERPL: 1.5 {RATIO} (ref 0.4–1.6)
ALP SERPL-CCNC: 41 U/L (ref 50–136)
ALT SERPL-CCNC: 21 U/L (ref 12–65)
ANION GAP SERPL CALC-SCNC: 9 MMOL/L (ref 2–11)
AST SERPL-CCNC: 14 U/L (ref 15–37)
BASOPHILS # BLD: 0 K/UL (ref 0–0.2)
BASOPHILS NFR BLD: 0 % (ref 0–2)
BILIRUB SERPL-MCNC: 1.5 MG/DL (ref 0.2–1.1)
BILIRUBIN, URINE, POC: NEGATIVE
BLOOD URINE, POC: NEGATIVE
BUN SERPL-MCNC: 14 MG/DL (ref 6–23)
CALCIUM SERPL-MCNC: 9.4 MG/DL (ref 8.3–10.4)
CHLORIDE SERPL-SCNC: 105 MMOL/L (ref 101–110)
CO2 SERPL-SCNC: 24 MMOL/L (ref 21–32)
CREAT SERPL-MCNC: 0.8 MG/DL (ref 0.6–1)
DIFFERENTIAL METHOD BLD: NORMAL
EOSINOPHIL # BLD: 0 K/UL (ref 0–0.8)
EOSINOPHIL NFR BLD: 1 % (ref 0.5–7.8)
ERYTHROCYTE [DISTWIDTH] IN BLOOD BY AUTOMATED COUNT: 12.4 % (ref 11.9–14.6)
GLOBULIN SER CALC-MCNC: 2.8 G/DL (ref 2.8–4.5)
GLUCOSE SERPL-MCNC: 94 MG/DL (ref 65–100)
GLUCOSE URINE, POC: NEGATIVE
HCT VFR BLD AUTO: 43.4 % (ref 35.8–46.3)
HGB BLD-MCNC: 14.2 G/DL (ref 11.7–15.4)
IMM GRANULOCYTES # BLD AUTO: 0 K/UL (ref 0–0.5)
IMM GRANULOCYTES NFR BLD AUTO: 0 % (ref 0–5)
KETONES, URINE, POC: NEGATIVE
LEUKOCYTE ESTERASE, URINE, POC: NEGATIVE
LIPASE SERPL-CCNC: 176 U/L (ref 73–393)
LYMPHOCYTES # BLD: 1.3 K/UL (ref 0.5–4.6)
LYMPHOCYTES NFR BLD: 28 % (ref 13–44)
MCH RBC QN AUTO: 31.1 PG (ref 26.1–32.9)
MCHC RBC AUTO-ENTMCNC: 32.7 G/DL (ref 31.4–35)
MCV RBC AUTO: 95.2 FL (ref 82–102)
MONOCYTES # BLD: 0.3 K/UL (ref 0.1–1.3)
MONOCYTES NFR BLD: 6 % (ref 4–12)
NEUTS SEG # BLD: 3 K/UL (ref 1.7–8.2)
NEUTS SEG NFR BLD: 65 % (ref 43–78)
NITRITE, URINE, POC: POSITIVE
NRBC # BLD: 0 K/UL (ref 0–0.2)
PH, URINE, POC: 6.5 (ref 4.6–8)
PLATELET # BLD AUTO: 259 K/UL (ref 150–450)
PMV BLD AUTO: 10.2 FL (ref 9.4–12.3)
POTASSIUM SERPL-SCNC: 4.3 MMOL/L (ref 3.5–5.1)
PROT SERPL-MCNC: 7.1 G/DL (ref 6.3–8.2)
PROTEIN,URINE, POC: NEGATIVE
RBC # BLD AUTO: 4.56 M/UL (ref 4.05–5.2)
SODIUM SERPL-SCNC: 138 MMOL/L (ref 133–143)
SPECIFIC GRAVITY, URINE, POC: 1.02 (ref 1–1.03)
URINALYSIS CLARITY, POC: ABNORMAL
URINALYSIS COLOR, POC: ABNORMAL
UROBILINOGEN, POC: 0.2
WBC # BLD AUTO: 4.6 K/UL (ref 4.3–11.1)

## 2022-12-05 PROCEDURE — 81003 URINALYSIS AUTO W/O SCOPE: CPT | Performed by: NURSE PRACTITIONER

## 2022-12-05 PROCEDURE — 99214 OFFICE O/P EST MOD 30 MIN: CPT | Performed by: NURSE PRACTITIONER

## 2022-12-05 RX ORDER — NITROFURANTOIN 25; 75 MG/1; MG/1
100 CAPSULE ORAL 2 TIMES DAILY
Qty: 20 CAPSULE | Refills: 0 | Status: SHIPPED | OUTPATIENT
Start: 2022-12-05 | End: 2022-12-15

## 2022-12-05 RX ORDER — LANSOPRAZOLE 30 MG/1
30 CAPSULE, DELAYED RELEASE ORAL 2 TIMES DAILY
COMMUNITY
Start: 2022-12-01

## 2022-12-05 ASSESSMENT — ANXIETY QUESTIONNAIRES
4. TROUBLE RELAXING: 0
GAD7 TOTAL SCORE: 0
IF YOU CHECKED OFF ANY PROBLEMS ON THIS QUESTIONNAIRE, HOW DIFFICULT HAVE THESE PROBLEMS MADE IT FOR YOU TO DO YOUR WORK, TAKE CARE OF THINGS AT HOME, OR GET ALONG WITH OTHER PEOPLE: NOT DIFFICULT AT ALL
2. NOT BEING ABLE TO STOP OR CONTROL WORRYING: 0
5. BEING SO RESTLESS THAT IT IS HARD TO SIT STILL: 0
3. WORRYING TOO MUCH ABOUT DIFFERENT THINGS: 0
1. FEELING NERVOUS, ANXIOUS, OR ON EDGE: 0
7. FEELING AFRAID AS IF SOMETHING AWFUL MIGHT HAPPEN: 0
6. BECOMING EASILY ANNOYED OR IRRITABLE: 0

## 2022-12-05 ASSESSMENT — ENCOUNTER SYMPTOMS
SHORTNESS OF BREATH: 0
WHEEZING: 0
BLOOD IN STOOL: 0
ABDOMINAL DISTENTION: 0
ABDOMINAL PAIN: 1
NAUSEA: 0
DIARRHEA: 0
COUGH: 0
CONSTIPATION: 1
BACK PAIN: 1
VOMITING: 0

## 2022-12-05 ASSESSMENT — PATIENT HEALTH QUESTIONNAIRE - PHQ9
SUM OF ALL RESPONSES TO PHQ QUESTIONS 1-9: 0
SUM OF ALL RESPONSES TO PHQ9 QUESTIONS 1 & 2: 0
SUM OF ALL RESPONSES TO PHQ QUESTIONS 1-9: 0
1. LITTLE INTEREST OR PLEASURE IN DOING THINGS: 0
2. FEELING DOWN, DEPRESSED OR HOPELESS: 0

## 2022-12-05 NOTE — PROGRESS NOTES
[facility]  Office Visit Note    Subjective:  Chief Complaint   Patient presents with    Abdominal Pain     Off and on, burning sensation, pressure like feeling, right side tenderness, feels swollen, has a history of gastritis- is taking prevacid BID        Patient ID: Justus Mejia is a 42 y.o. female presenting to the office for the above. 42year old female with abdominal pain. PCP is Dr. Chas Blevins. Pain has been intermittent for over a year. Comes in the RUQ and may last a few days, then go away for a while. Described as a burning and a pressure in the RUQ/right side. Has been worsening over past month; now with pain/pressure generalized across lower abdomen as well. Denies vomiting (occasionally has some mild nausea), blood in stool. Bowel movements vary; sometimes loose, sometimes borders on constipation. Appetite is fairly normal.   She has a history of gastritis (two endoscopies in the past). Also history of RUQ pain; workup January 2022 included negative ultrasound. She sees Dr. Steven Perez at [address] ([facility] last saw them in February 2022), but has not been able to get an appointment until January. Labs in September were unremarkable except for bilirubin elevated at 1.6. She is taking OTC Prevacid BID. Urinalysis positive for nitrites in office today. --Treat with Macrobid. Discussed risks/benefits, alternatives, potential side effects, proper administration of medication; educational handout given. Will send urine for culture, with treatment adjustment as indicated. Stay well hydrated. --Check labs today   --Advised to follow up with GI; she plans to call them this afternoon to see if she can get an earlier appointment. --If abdominal pain fails to resolve with antibiotic, will check abdominal CT. --Advised of symptoms that would require reevaluation, or that would require immediate medical attention in the ER; patient expresses understanding. History: Allergies   Allergen Reactions    Penicillin G Other (See Comments)     As a child and has taken as an adult without difficulty    Sulfa Antibiotics Other (See Comments)     Hives. Face and throat swelling       Past Medical History:   Diagnosis Date    Allergic rhinitis 10/24/2016    Anxiety     Bilateral ovarian cysts 2020    Chronic superficial gastritis without bleeding 2016 - EGD - gastritis moderate gastritis in the body -biopsied.  Dr. Sánchez Navarro     Gastroesophageal reflux disease 10/24/2016    Hiatal hernia     per pt report found as a child    Intraductal papilloma of breast, right 2021    Spondylosis of cervical region without myelopathy or radiculopathy 2020    MRI cervical spine  - POA showed some degenerative disc disease and osteophytes with mild/moderate spinal stenosis       Past Surgical History:   Procedure Laterality Date    BREAST BIOPSY Right 2021    BREAST BIOPSY Right 2021    RIGHT BREAST NEEDLE LOCALIZED BIOPSY PREOP 0830/ LOC 1000/ SURGERY 1200 performed by Kodi Chase DO at [address] [place] [address] LUMPECTOMY Right 2000    fibroadenoma     SECTION      2    MASTECTOMY Right 2021    RIGHT BREAST PARTIAL MASTECTOMY performed by Kodi Chase DO at [facility] RIGHT Right 2021    US BREAST NEEDLE BIOPSY RIGHT 2021 [facility]    US GUIDED NEEDLE LOC OF RIGHT BREAST Right 2021    US GUIDED NEEDLE LOC OF RIGHT BREAST 2021 [facility]       Family History   Problem Relation Age of Onset    Hypertension Father     Pacemaker Father     Cancer Mother         renal cell    Neuropathy Mother     Asthma Mother     Kidney Disease Mother     Breast Cancer Mother     Heart Disease Paternal Grandfather     Heart Disease Paternal Grandmother     Stroke Paternal Grandmother     Colon Cancer Maternal Grandfather     Hypertension Maternal Grandmother     Cancer Maternal Grandmother     Glaucoma Maternal Grandmother     Asthma Brother     Drug Abuse Brother     Thyroid Disease Sister     Other Sister     Cancer Father     Hypertension Mother        Social History     Tobacco Use   Smoking Status Former    Packs/day: 1.00    Types: Cigarettes    Start date: 1998    Quit date: 2010    Years since quittin.9   Smokeless Tobacco Never       Social History     Substance and Sexual Activity   Alcohol Use Yes       Current Outpatient Medications   Medication Sig Dispense Refill    lansoprazole (PREVACID) 30 MG delayed release capsule Take 30 mg by mouth 2 times daily      nitrofurantoin, macrocrystal-monohydrate, (MACROBID) 100 MG capsule Take 1 capsule by mouth 2 times daily for 10 days 20 capsule 0    cyanocobalamin 1000 MCG tablet 1,000 mcg      B COMPLEX-C PO Take 1 tablet by mouth daily      magnesium oxide (MAG-OX) 400 MG tablet Take 400 mg by mouth daily      Digestive Enzymes (PAPAYA AND ENZYMES PO) Take by mouth      acetaminophen (TYLENOL) 325 MG tablet Take by mouth every 4 hours as needed      vitamin D (CHOLECALCIFEROL) 25 MCG (1000 UT) TABS tablet Take by mouth daily      cyclobenzaprine (FLEXERIL) 10 MG tablet Take 10 mg by mouth 3 times daily as needed       No current facility-administered medications for this visit. Review of Systems:  Review of Systems   Constitutional:  Positive for appetite change (slightly decreased). Negative for activity change, chills, diaphoresis, fever and unexpected weight change. HENT:  Negative for congestion. Respiratory:  Negative for cough, shortness of breath and wheezing. Cardiovascular:  Negative for chest pain and palpitations. Gastrointestinal:  Positive for abdominal pain (generalized, but more in RUQ) and constipation (occasional constipation, occasional loose stools). Negative for abdominal distention, blood in stool, diarrhea, nausea and vomiting.    Genitourinary: Negative for difficulty urinating, dysuria, hematuria and pelvic pain. Musculoskeletal:  Positive for back pain. Skin:  Negative for pallor and rash. Neurological:  Negative for dizziness, weakness and headaches. Psychiatric/Behavioral:  Negative for dysphoric mood. The patient is not nervous/anxious. See HPI for other pertinent positives and negatives. Objective:  Vitals:    12/05/22 1036   BP: 113/77   Site: Right Upper Arm   Position: Sitting   Cuff Size: Small Adult   Pulse: 87   Temp: 98.6 °F (37 °C)   TempSrc: Temporal   SpO2: 99%   Weight: 121 lb 9.6 oz (55.2 kg)   Height: 5' 2\" (1.575 m)       Body mass index is 22.24 kg/m². Physical Exam  Vitals and nursing note reviewed. Constitutional:       General: She is not in acute distress. Appearance: Normal appearance. She is not ill-appearing or diaphoretic. HENT:      Head: Normocephalic and atraumatic. Eyes:      General: No scleral icterus. Right eye: No discharge. Left eye: No discharge. Cardiovascular:      Rate and Rhythm: Normal rate and regular rhythm. Heart sounds: Normal heart sounds. Pulmonary:      Effort: Pulmonary effort is normal. No respiratory distress. Breath sounds: Normal breath sounds. No wheezing, rhonchi or rales. Abdominal:      General: Bowel sounds are normal. There is no distension. Palpations: Abdomen is soft. Tenderness: There is abdominal tenderness (generalized tenderness, worse in lower abdomen and RUQ). There is no guarding or rebound. Skin:     General: Skin is warm and dry. Neurological:      Mental Status: She is alert and oriented to person, place, and time.    Psychiatric:         Mood and Affect: Mood normal.         Speech: Speech normal.         Behavior: Behavior normal.                Lab Results   Component Value Date    WBC 8.3 09/27/2022    HGB 12.8 09/27/2022    HCT 39.0 09/27/2022    MCV 94.9 09/27/2022     09/27/2022   ,   Lab Results   Component Value Date/Time     09/27/2022 02:30 PM    K 4.2 09/27/2022 02:30 PM     09/27/2022 02:30 PM    CO2 29 09/27/2022 02:30 PM    BUN 12 09/27/2022 02:30 PM    CREATININE 0.70 09/27/2022 02:30 PM    GLUCOSE 99 09/27/2022 02:30 PM    CALCIUM 9.2 09/27/2022 02:30 PM    ,  Lab Results   Component Value Date    ALT 19 09/27/2022    AST 11 (L) 09/27/2022    ALKPHOS 39 (L) 09/27/2022    BILITOT 1.6 (H) 09/27/2022   ,    PHQ-9  12/5/2022   Little interest or pleasure in doing things 0   Feeling down, depressed, or hopeless 0   Trouble falling or staying asleep, or sleeping too much -   Feeling tired or having little energy -   Poor appetite or overeating -   Feeling bad about yourself - or that you are a failure or have let yourself or your family down -   Trouble concentrating on things, such as reading the newspaper or watching television -   Moving or speaking so slowly that other people could have noticed. Or the opposite - being so fidgety or restless that you have been moving around a lot more than usual -   Thoughts that you would be better off dead, or of hurting yourself in some way -   PHQ-2 Score 0   PHQ-9 Total Score 0   If you checked off any problems, how difficult have these problems made it for you to do your work, take care of things at home, or get along with other people? -        Assessment/Plan:      Health Maintenance Due   Topic Date Due    Cervical cancer screen  Never done    COVID-19 Vaccine (4 - Booster for Moderna series) 02/06/2022          [facility] was [facility] today for abdominal pain. Diagnoses and all orders for this visit:    Generalized abdominal pain  -     CBC with Auto Differential; Future  -     Comprehensive Metabolic Panel; Future  -     Lipase; Future  -     AMB POC URINALYSIS DIP STICK AUTO W/O MICRO  -     CT ABDOMEN PELVIS W WO CONTRAST Additional Contrast? Radiologist Recommendation;  Future    RUQ abdominal pain  -     CBC with Auto Differential; Future  -     Comprehensive Metabolic Panel; Future  -     Lipase; Future  -     AMB POC URINALYSIS DIP STICK AUTO W/O MICRO  -     CT ABDOMEN PELVIS W WO CONTRAST Additional Contrast? Radiologist Recommendation; Future    Acute cystitis without hematuria  -     nitrofurantoin, macrocrystal-monohydrate, (MACROBID) 100 MG capsule; Take 1 capsule by mouth 2 times daily for 10 days  -     Culture, Urine; Future      Encouraged to contact office with any concerns prior to next visit. Advise patient to notify office if they do not receive results of any labs or tests ordered within 2-3 days of the lab/test.     Return if symptoms worsen or fail to improve, for Next scheduled visit.     Thalia Salcedo, APRN - CNP

## 2022-12-07 ENCOUNTER — TELEPHONE (OUTPATIENT)
Dept: INTERNAL MEDICINE CLINIC | Facility: CLINIC | Age: 42
End: 2022-12-07

## 2022-12-07 DIAGNOSIS — N30.00 ACUTE CYSTITIS WITHOUT HEMATURIA: Primary | ICD-10-CM

## 2022-12-07 LAB
BACTERIA SPEC CULT: ABNORMAL
SERVICE CMNT-IMP: ABNORMAL

## 2022-12-07 RX ORDER — CEPHALEXIN 500 MG/1
500 CAPSULE ORAL 3 TIMES DAILY
Qty: 21 CAPSULE | Refills: 0 | Status: SHIPPED | OUTPATIENT
Start: 2022-12-07 | End: 2022-12-14

## 2022-12-07 NOTE — TELEPHONE ENCOUNTER
Pt states she had an appt with [facility] earlier this week and was prescribed macrobid, she wants to know if this prescription would change since the results of her urinalisys have come back. She also had some questions about a CT scan. She asks if she could be called back before 4:30 today, her daughter has a recital and she will not be by her phone after that time. A call back number is [phone].

## 2022-12-08 NOTE — TELEPHONE ENCOUNTER
Notified patient per ALLAN Smith that [facility] was sent instead. If her pain does not resolve with the antibiotic, would schedule the CT scan for further evaluation. Patient verbally expressed understanding and denied all additional questions, comments and/or concerns.

## 2022-12-08 NOTE — TELEPHONE ENCOUNTER
Keflex was sent instead. If her pain does not resolve with the antibiotic, would schedule the CT scan for further evaluation.

## 2022-12-13 ENCOUNTER — TELEPHONE (OUTPATIENT)
Dept: INTERNAL MEDICINE CLINIC | Facility: CLINIC | Age: 42
End: 2022-12-13

## 2022-12-13 NOTE — TELEPHONE ENCOUNTER
Attempt to contact patient to discuss concerns were unsuccessful, voicemail to return call was made.

## 2022-12-13 NOTE — TELEPHONE ENCOUNTER
Pt states that she is having pin and needles all over her body. She also is wanting to follow up with a provider about some abdominal pain (she saw Patterson Halt about this on 12/5). Wants to know if an order for a CT scan could be done. She can be reached at [phone].

## 2022-12-14 ENCOUNTER — HOSPITAL ENCOUNTER (EMERGENCY)
Dept: CT IMAGING | Age: 42
Discharge: HOME | End: 2022-12-17
Payer: COMMERCIAL

## 2022-12-14 ENCOUNTER — HOSPITAL ENCOUNTER (EMERGENCY)
Age: 42
Discharge: HOME | End: 2022-12-14
Attending: EMERGENCY MEDICINE
Payer: COMMERCIAL

## 2022-12-14 VITALS
HEIGHT: 61 IN | DIASTOLIC BLOOD PRESSURE: 60 MMHG | WEIGHT: 121 LBS | BODY MASS INDEX: 22.84 KG/M2 | TEMPERATURE: 98.5 F | RESPIRATION RATE: 16 BRPM | SYSTOLIC BLOOD PRESSURE: 119 MMHG | OXYGEN SATURATION: 98 % | HEART RATE: 100 BPM

## 2022-12-14 DIAGNOSIS — R42 VERTIGO: Primary | ICD-10-CM

## 2022-12-14 LAB
ALBUMIN SERPL-MCNC: 4.3 G/DL (ref 3.5–5)
ALBUMIN/GLOB SERPL: 1.3 {RATIO} (ref 0.4–1.6)
ALP SERPL-CCNC: 42 U/L (ref 50–130)
ALT SERPL-CCNC: 19 U/L (ref 12–65)
ANION GAP SERPL CALC-SCNC: 6 MMOL/L (ref 2–11)
APPEARANCE UR: CLEAR
AST SERPL-CCNC: 11 U/L (ref 15–37)
BASOPHILS # BLD: 0 K/UL (ref 0–0.2)
BASOPHILS NFR BLD: 1 % (ref 0–2)
BILIRUB SERPL-MCNC: 1.4 MG/DL (ref 0.2–1.1)
BILIRUB UR QL: NEGATIVE
BUN SERPL-MCNC: 13 MG/DL (ref 6–23)
CALCIUM SERPL-MCNC: 9.2 MG/DL (ref 8.3–10.4)
CHLORIDE SERPL-SCNC: 106 MMOL/L (ref 101–110)
CO2 SERPL-SCNC: 27 MMOL/L (ref 21–32)
COLOR UR: NORMAL
CREAT SERPL-MCNC: 0.75 MG/DL (ref 0.6–1)
D DIMER PPP FEU-MCNC: 0.34 UG/ML(FEU)
DIFFERENTIAL METHOD BLD: ABNORMAL
EKG ATRIAL RATE: 84 BPM
EKG DIAGNOSIS: NORMAL
EKG P AXIS: 87 DEGREES
EKG P-R INTERVAL: 128 MS
EKG Q-T INTERVAL: 342 MS
EKG QRS DURATION: 70 MS
EKG QTC CALCULATION (BAZETT): 404 MS
EKG R AXIS: 77 DEGREES
EKG T AXIS: 75 DEGREES
EKG VENTRICULAR RATE: 84 BPM
EOSINOPHIL # BLD: 0.1 K/UL (ref 0–0.8)
EOSINOPHIL NFR BLD: 1 % (ref 0.5–7.8)
ERYTHROCYTE [DISTWIDTH] IN BLOOD BY AUTOMATED COUNT: 12.2 % (ref 11.9–14.6)
GLOBULIN SER CALC-MCNC: 3.3 G/DL (ref 2.8–4.5)
GLUCOSE SERPL-MCNC: 99 MG/DL (ref 65–100)
GLUCOSE UR STRIP.AUTO-MCNC: NEGATIVE MG/DL
HCG UR QL: NEGATIVE
HCT VFR BLD AUTO: 43.2 % (ref 35.8–46.3)
HGB BLD-MCNC: 14.7 G/DL (ref 11.7–15.4)
HGB UR QL STRIP: NEGATIVE
IMM GRANULOCYTES # BLD AUTO: 0 K/UL (ref 0–0.5)
IMM GRANULOCYTES NFR BLD AUTO: 0 % (ref 0–5)
KETONES UR QL STRIP.AUTO: NEGATIVE MG/DL
LACTATE SERPL-SCNC: 0.8 MMOL/L (ref 0.4–2)
LEUKOCYTE ESTERASE UR QL STRIP.AUTO: NEGATIVE
LYMPHOCYTES # BLD: 1.7 K/UL (ref 0.5–4.6)
LYMPHOCYTES NFR BLD: 28 % (ref 13–44)
MCH RBC QN AUTO: 31.5 PG (ref 26.1–32.9)
MCHC RBC AUTO-ENTMCNC: 34 G/DL (ref 31.4–35)
MCV RBC AUTO: 92.5 FL (ref 82–102)
MONOCYTES # BLD: 0.4 K/UL (ref 0.1–1.3)
MONOCYTES NFR BLD: 6 % (ref 4–12)
NEUTS SEG # BLD: 4 K/UL (ref 1.7–8.2)
NEUTS SEG NFR BLD: 64 % (ref 43–78)
NITRITE UR QL STRIP.AUTO: NEGATIVE
NRBC # BLD: 0 K/UL (ref 0–0.2)
PH UR STRIP: 6 [PH] (ref 5–9)
PLATELET # BLD AUTO: 275 K/UL (ref 150–450)
PMV BLD AUTO: 9.2 FL (ref 9.4–12.3)
POTASSIUM SERPL-SCNC: 4 MMOL/L (ref 3.5–5.1)
PROT SERPL-MCNC: 7.6 G/DL (ref 6.3–8.2)
PROT UR STRIP-MCNC: NEGATIVE MG/DL
RBC # BLD AUTO: 4.67 M/UL (ref 4.05–5.2)
SODIUM SERPL-SCNC: 139 MMOL/L (ref 133–143)
SP GR UR REFRACTOMETRY: 1.01 (ref 1–1.02)
TROPONIN I SERPL HS-MCNC: 4.1 PG/ML (ref 0–14)
UROBILINOGEN UR QL STRIP.AUTO: 0.2 EU/DL (ref 0.2–1)
WBC # BLD AUTO: 6.1 K/UL (ref 4.3–11.1)

## 2022-12-14 PROCEDURE — 84484 ASSAY OF TROPONIN QUANT: CPT

## 2022-12-14 PROCEDURE — 81003 URINALYSIS AUTO W/O SCOPE: CPT

## 2022-12-14 PROCEDURE — 96360 HYDRATION IV INFUSION INIT: CPT

## 2022-12-14 PROCEDURE — 2580000003 HC RX 258

## 2022-12-14 PROCEDURE — 99284 EMERGENCY DEPT VISIT MOD MDM: CPT

## 2022-12-14 PROCEDURE — 93005 ELECTROCARDIOGRAM TRACING: CPT

## 2022-12-14 PROCEDURE — 70450 CT HEAD/BRAIN W/O DYE: CPT

## 2022-12-14 PROCEDURE — 85379 FIBRIN DEGRADATION QUANT: CPT

## 2022-12-14 PROCEDURE — 80053 COMPREHEN METABOLIC PANEL: CPT

## 2022-12-14 PROCEDURE — 85025 COMPLETE CBC W/AUTO DIFF WBC: CPT

## 2022-12-14 PROCEDURE — 83605 ASSAY OF LACTIC ACID: CPT

## 2022-12-14 PROCEDURE — 81025 URINE PREGNANCY TEST: CPT

## 2022-12-14 RX ORDER — KETOROLAC TROMETHAMINE 30 MG/ML
30 INJECTION, SOLUTION INTRAMUSCULAR; INTRAVENOUS ONCE
Status: DISCONTINUED | OUTPATIENT
Start: 2022-12-14 | End: 2022-12-14

## 2022-12-14 RX ORDER — MECLIZINE HYDROCHLORIDE 25 MG/1
25 TABLET ORAL 3 TIMES DAILY PRN
Qty: 15 TABLET | Refills: 0 | Status: SHIPPED | OUTPATIENT
Start: 2022-12-14 | End: 2022-12-24

## 2022-12-14 RX ORDER — ONDANSETRON 2 MG/ML
4 INJECTION INTRAMUSCULAR; INTRAVENOUS
Status: DISCONTINUED | OUTPATIENT
Start: 2022-12-14 | End: 2022-12-14

## 2022-12-14 RX ORDER — 0.9 % SODIUM CHLORIDE 0.9 %
1000 INTRAVENOUS SOLUTION INTRAVENOUS ONCE
Status: COMPLETED | OUTPATIENT
Start: 2022-12-14 | End: 2022-12-14

## 2022-12-14 RX ADMIN — SODIUM CHLORIDE 1000 ML: 9 INJECTION, SOLUTION INTRAVENOUS at 17:59

## 2022-12-14 ASSESSMENT — ENCOUNTER SYMPTOMS
SHORTNESS OF BREATH: 0
ABDOMINAL PAIN: 0
VOMITING: 0
NAUSEA: 0
CHEST TIGHTNESS: 0
DIARRHEA: 0
COLOR CHANGE: 0

## 2022-12-14 ASSESSMENT — PAIN DESCRIPTION - DESCRIPTORS: DESCRIPTORS: PRESSURE;SHARP

## 2022-12-14 ASSESSMENT — PAIN - FUNCTIONAL ASSESSMENT
PAIN_FUNCTIONAL_ASSESSMENT: NONE - DENIES PAIN
PAIN_FUNCTIONAL_ASSESSMENT: 0-10

## 2022-12-14 ASSESSMENT — PAIN DESCRIPTION - LOCATION: LOCATION: HEAD

## 2022-12-14 ASSESSMENT — PAIN SCALES - GENERAL: PAINLEVEL_OUTOF10: 3

## 2022-12-14 NOTE — TELEPHONE ENCOUNTER
Spoke with patient and she states that she saw Scheryl Sailors and was scheduled for a HIDA scan and was wondering if she still needed a CT scan being that she is still experiencing a pin and needle sensation all over her body especially in her foot at bedtime. Patient desired that this message be forwarded to ALLAN Condon due to her seeing her last.    Please advise.

## 2022-12-14 NOTE — ED PROVIDER NOTES
Emergency Department Provider Note                   PCP:                Kristine Holland DO               Age: 42 y.o. Sex: female       ICD-10-CM    1. Vertigo  R42           DISPOSITION Decision To Discharge 12/14/2022 07:55:24 PM       MDM  Number of Diagnoses or Management Options  Vertigo  Diagnosis management comments: Patient is a 79-year-old  female with history as highlighted in the HPI presenting for evaluation of an episode of vertigo and dizziness while at work earlier this afternoon. She describes the sensation as room spinning which required her to have coworkers assist her to the floor. She has had some episodes of this in the past but has not had one in some time. She is also complaining of bilateral paresthesias of the upper and lower extremities. Upon arrival, patient's vitals are stable with exception of some mild tachycardia of 103. Exam notable for some bilateral fatigable horizontal nystagmus. Otherwise reassuring. Neuro exam is nonfocal.  No changes in gait. Differentials at this point include BPPV, metabolic derangement, intracranial abnormality, arrhythmia. Patient's EKG normal sinus rhythm. CT head normal.  Lab work reassuring. Did give patient some fluids given her mild tachycardia which resolved. I do feel like this patient's symptoms are attributable to a peripheral vertigo, likely BPPV given her fatigable horizontal nystagmus and reproducible symptoms. Patient is feeling largely reassured with her negative work-up thus far. I did discuss a trial of meclizine for her symptoms for which she is agreeable. Patient does have an upcoming appointment with primary care. I do feel she is safe to discharge in stable condition with plans to keep her appoint with primary care for further evaluation. Return precautions discussed. Patient verbalizes understanding and is agreeable to this plan.        Amount and/or Complexity of Data Reviewed  Clinical lab tests: ordered  Tests in the radiology section of CPT®: ordered  Tests in the medicine section of CPT®: ordered    Risk of Complications, Morbidity, and/or Mortality  Presenting problems: low  Diagnostic procedures: low  Management options: low    Patient Progress  Patient progress: stable             Orders Placed This Encounter   Procedures    CT Head W/O Contrast    CBC with Auto Differential    Comprehensive Metabolic Panel    Lactic Acid    Urinalysis    D-Dimer, Quantitative    Troponin    POC PREGNANCY UR-QUAL    POC Pregnancy Urine Qual    EKG 12 Lead    Insert peripheral IV        Medications   0.9 % sodium chloride bolus (0 mLs IntraVENous Stopped 12/14/22 1949)       Discharge Medication List as of 12/14/2022  7:50 PM        START taking these medications    Details   meclizine (ANTIVERT) 25 MG tablet Take 1 tablet by mouth 3 times daily as needed for Dizziness, Disp-15 tablet, R-0Normal              Dennis Reyes is a 42 y.o. female who presents to the Emergency Department with chief complaint of    Chief Complaint   Patient presents with    Dizziness      Dennis Reyes is a 76-year-old  female with history of anxiety, GERD, cervical stenosis presenting to the emergency department for evaluation of vertigo and dizziness while at work earlier this afternoon. Patient states that she suddenly felt unsteady and felt as if the room was spinning, causing some coworkers to slowly lower her to the ground. Patient states that she has had similar events of this in the past but she has not had an episode in some time. She also has some associated paresthesias of her bilateral upper and lower extremities as well as some left-sided facial twitching. Patient states that she recently started an antibiotic for a UTI and is concerned that these may be side effects of this medicine. She is not currently experiencing any of her symptoms. She does have some minimal associated nausea.   She denies any shortness of breath, chest pain, fever or chills. She denies any changes in her hearing or tinnitus. She denies any additional aggravating or alleviating factors or radiation of her symptoms. She has no other complaints today. The history is provided by the patient. All other systems reviewed and are negative unless otherwise stated in the history of present illness section. Review of Systems   Constitutional:  Negative for chills, fatigue and fever. Eyes:  Negative for visual disturbance. Respiratory:  Negative for chest tightness and shortness of breath. Cardiovascular:  Negative for chest pain and palpitations. Gastrointestinal:  Negative for abdominal pain, diarrhea, nausea and vomiting. Genitourinary:  Negative for dysuria. Musculoskeletal:  Negative for arthralgias and myalgias. Skin:  Negative for color change and wound. Neurological:  Positive for dizziness and headaches. Negative for syncope, weakness and light-headedness. All other systems reviewed and are negative. Past Medical History:   Diagnosis Date    Allergic rhinitis 10/24/2016    Anxiety     Bilateral ovarian cysts 01/20/2020    Chronic superficial gastritis without bleeding 11/11/2016 11-8-2016 - EGD - gastritis moderate gastritis in the body -biopsied.  Dr. Valencia Hernandez     Gastroesophageal reflux disease 10/24/2016    Hiatal hernia     per pt report found as a child    Intraductal papilloma of breast, right 01/25/2021    Spondylosis of cervical region without myelopathy or radiculopathy 01/20/2020    MRI cervical spine  - POA showed some degenerative disc disease and osteophytes with mild/moderate spinal stenosis        Past Surgical History:   Procedure Laterality Date    BREAST BIOPSY Right 01/07/2021    BREAST BIOPSY Right 1/25/2021    RIGHT BREAST NEEDLE LOCALIZED BIOPSY PREOP 0830/ LOC 1000/ SURGERY 1200 performed by Sara Nielsen DO at [facility] Right 2000 fibroadenoma     SECTION      2    MASTECTOMY Right 2021    RIGHT BREAST PARTIAL MASTECTOMY performed by Hussain Hernandez DO at [address] [facility]. BIOPSY RIGHT Right 2021    US BREAST NEEDLE BIOPSY RIGHT 2021 [facility]    US GUIDED NEEDLE LOC OF RIGHT BREAST Right 2021    US GUIDED NEEDLE LOC OF RIGHT BREAST 2021 [facility]        Family History   Problem Relation Age of Onset    Hypertension Father     Pacemaker Father     Cancer Mother         renal cell    Neuropathy Mother     Asthma Mother     Kidney Disease Mother     Breast Cancer Mother     Heart Disease Paternal Grandfather     Heart Disease Paternal Grandmother     Stroke Paternal Grandmother     Colon Cancer Maternal Grandfather     Hypertension Maternal Grandmother     Cancer Maternal Grandmother     Glaucoma Maternal Grandmother     Asthma Brother     Drug Abuse Brother     Thyroid Disease Sister     Other Sister     Cancer Father     Hypertension Mother         Social History     Socioeconomic History    Marital status:    Tobacco Use    Smoking status: Former     Packs/day: 1.00     Types: Cigarettes     Start date: 1998     Quit date: 2010     Years since quittin.9    Smokeless tobacco: Never   Substance and Sexual Activity    Alcohol use: Yes    Drug use: No   Social History Narrative    Lives with  and children  and      Social Determinants of Health     Physical Activity: Insufficiently Active    Days of Exercise per Week: 3 days    Minutes of Exercise per Session: 20 min   Intimate Partner Violence: Not At Risk    Fear of Current or Ex-Partner: No    Emotionally Abused: No    Physically Abused: No    Sexually Abused: No        Allergies: Penicillin g and Sulfa antibiotics    Discharge Medication List as of 2022  7:50 PM        CONTINUE these medications which have NOT CHANGED    Details   cephALEXin (KEFLEX) 500 MG capsule Take 1 capsule by mouth 3 times daily for 7 days, Disp-21 capsule, R-0Normal      lansoprazole (PREVACID) 30 MG delayed release capsule Take 30 mg by mouth 2 times dailyHistorical Med      nitrofurantoin, macrocrystal-monohydrate, (MACROBID) 100 MG capsule Take 1 capsule by mouth 2 times daily for 10 days, Disp-20 capsule, R-0Normal      cyanocobalamin 1000 MCG tablet 1,000 mcgHistorical Med      B COMPLEX-C PO Take 1 tablet by mouth dailyHistorical Med      magnesium oxide (MAG-OX) 400 MG tablet Take 400 mg by mouth dailyHistorical Med      Digestive Enzymes (PAPAYA AND ENZYMES PO) Take by mouthHistorical Med      acetaminophen (TYLENOL) 325 MG tablet Take by mouth every 4 hours as neededHistorical Med      vitamin D (CHOLECALCIFEROL) 25 MCG (1000 UT) TABS tablet Take by mouth dailyHistorical Med      cyclobenzaprine (FLEXERIL) 10 MG tablet Take 10 mg by mouth 3 times daily as neededHistorical Med              Vitals signs and nursing note reviewed. Patient Vitals for the past 4 hrs:   Temp Pulse Resp BP SpO2   12/14/22 1947 98.5 °F (36.9 °C) 100 16 119/60 98 %          Physical Exam  Vitals and nursing note reviewed. Constitutional:       General: She is not in acute distress. Appearance: Normal appearance. She is not ill-appearing. HENT:      Head: Normocephalic and atraumatic. Right Ear: External ear normal.      Left Ear: External ear normal.      Nose: Nose normal.   Eyes:      General: No scleral icterus. Right eye: No discharge. Left eye: No discharge. Extraocular Movements: Extraocular movements intact. Conjunctiva/sclera: Conjunctivae normal.      Pupils: Pupils are equal, round, and reactive to light. Cardiovascular:      Rate and Rhythm: Normal rate and regular rhythm. Pulses: Normal pulses. Heart sounds: Normal heart sounds. Pulmonary:      Effort: Pulmonary effort is normal.      Breath sounds: Normal breath sounds. Abdominal:      General: Abdomen is flat. Palpations: Abdomen is soft. Tenderness: There is no abdominal tenderness. Musculoskeletal:         General: Normal range of motion. Cervical back: Normal range of motion and neck supple. Skin:     General: Skin is warm and dry. Neurological:      General: No focal deficit present. Mental Status: She is alert and oriented to person, place, and time. Cranial Nerves: No cranial nerve deficit. Motor: No weakness. Coordination: Coordination normal.      Gait: Gait normal.      Comments: Bilateral horizontal fatigable nystagmus. Psychiatric:         Mood and Affect: Mood normal.         Behavior: Behavior normal.        Procedures    ED EKG Interpretation  EKG was interpreted in the absence of a cardiologist.    Rate: 84  EKG Interpretation: EKG Interpretation: sinus rhythm   ST Segments: Normal ST segments - NO STEMI    Results for orders placed or performed during the hospital encounter of 12/14/22   CT Head W/O Contrast    Narrative    History: Dizziness     Exam: CT head without contrast    Technique: Thin section axial CT images were obtained from the skullbase through  the vertex. Radiation dose reduction techniques were used for this study. Our  CT scanners use one or all of the following: Automated exposure control,  adjustment of the mA and/or kV according to patient size, use of iterative  reconstruction. Findings: The ventricles are normal in size, shape, and position. No abnormal  parenchymal density is present. No extra-axial fluid collection is present. There is no mass or mass-effect. The basilar cisterns are patent. The paranasal  sinuses and mastoid air cells are clear.       Impression    Unremarkable CT head without contrast.       CBC with Auto Differential   Result Value Ref Range    WBC 6.1 4.3 - 11.1 K/uL    RBC 4.67 4.05 - 5.2 M/uL    Hemoglobin 14.7 11.7 - 15.4 g/dL    Hematocrit 43.2 35.8 - 46.3 %    MCV 92.5 82.0 - 102.0 FL    MCH 31.5 26.1 - 32.9 PG MCHC 34.0 31.4 - 35.0 g/dL    RDW 12.2 11.9 - 14.6 %    Platelets 550 474 - 042 K/uL    MPV 9.2 (L) 9.4 - 12.3 FL    nRBC 0.00 0.0 - 0.2 K/uL    Differential Type AUTOMATED      Seg Neutrophils 64 43 - 78 %    Lymphocytes 28 13 - 44 %    Monocytes 6 4.0 - 12.0 %    Eosinophils % 1 0.5 - 7.8 %    Basophils 1 0.0 - 2.0 %    Immature Granulocytes 0 0.0 - 5.0 %    Segs Absolute 4.0 1.7 - 8.2 K/UL    Absolute Lymph # 1.7 0.5 - 4.6 K/UL    Absolute Mono # 0.4 0.1 - 1.3 K/UL    Absolute Eos # 0.1 0.0 - 0.8 K/UL    Basophils Absolute 0.0 0.0 - 0.2 K/UL    Absolute Immature Granulocyte 0.0 0.0 - 0.5 K/UL   Comprehensive Metabolic Panel   Result Value Ref Range    Sodium 139 133 - 143 mmol/L    Potassium 4.0 3.5 - 5.1 mmol/L    Chloride 106 101 - 110 mmol/L    CO2 27 21 - 32 mmol/L    Anion Gap 6 2 - 11 mmol/L    Glucose 99 65 - 100 mg/dL    BUN 13 6 - 23 MG/DL    Creatinine 0.75 0.6 - 1.0 MG/DL    Est, Glom Filt Rate >60 >60 ml/min/1.73m2    Calcium 9.2 8.3 - 10.4 MG/DL    Total Bilirubin 1.4 (H) 0.2 - 1.1 MG/DL    ALT 19 12 - 65 U/L    AST 11 (L) 15 - 37 U/L    Alk Phosphatase 42 (L) 50 - 130 U/L    Total Protein 7.6 6.3 - 8.2 g/dL    Albumin 4.3 3.5 - 5.0 g/dL    Globulin 3.3 2.8 - 4.5 g/dL    Albumin/Globulin Ratio 1.3 0.4 - 1.6     Lactic Acid   Result Value Ref Range    Lactic Acid, Plasma 0.8 0.4 - 2.0 MMOL/L   Urinalysis   Result Value Ref Range    Color, UA YELLOW/STRAW      Appearance CLEAR      Specific [place], UA 1.011 1.001 - 1.023      pH, Urine 6.0 5.0 - 9.0      Protein, UA Negative NEG mg/dL    Glucose, UA Negative mg/dL    Ketones, Urine Negative NEG mg/dL    Bilirubin Urine Negative NEG      Blood, Urine Negative NEG      Urobilinogen, Urine 0.2 0.2 - 1.0 EU/dL    Nitrite, Urine Negative NEG      Leukocyte Esterase, Urine Negative NEG     D-Dimer, Quantitative   Result Value Ref Range    D-Dimer, Quant 0.34 <0.56 ug/ml(FEU)   Troponin   Result Value Ref Range    Troponin, High Sensitivity 4.1 0 - 14 pg/mL POC Pregnancy Urine Qual   Result Value Ref Range    Preg Test, Ur Negative NEG     EKG 12 Lead   Result Value Ref Range    Ventricular Rate 84 BPM    Atrial Rate 84 BPM    P-R Interval 128 ms    QRS Duration 70 ms    Q-T Interval 342 ms    QTc Calculation (Bazett) 404 ms    P Axis 87 degrees    R Axis 77 degrees    T Axis 75 degrees    Diagnosis Sinus rhythm with sinus arrhythmia         CT Head W/O Contrast   Final Result   Unremarkable CT head without contrast.                                  Voice dictation software was used during the making of this note. This software is not perfect and grammatical and other typographical errors may be present. This note has not been completely proofread for errors.        [place], [place]  12/14/22 4052

## 2022-12-14 NOTE — ED TRIAGE NOTES
Pt c/o having two dizzy spells and nausea while at work, pt states she felt like the room was spinning. Pt states she was lowered to the ground and started having mid back pain at that time. Pt states she no longer has that back pain. Pt also c/o left side of jaw twitching last night and had left sided facial twitching today when she felt dizzy. Pt also c/o intermittent pins and needles in bilateral arms and legs and feet that started last Wednesday after third dose of antibiotic for UTI.

## 2022-12-15 NOTE — DISCHARGE INSTRUCTIONS
You were evaluated today in the emergency department with vertigo and some dizziness. Your vital signs today were normal.  Your heart rate was a little high when you came in. Your blood work looks great and did not show any signs of anemias, infection, electrolyte disturbances, or organ dysfunction. The CAT scan of your head was normal and did not show signs of tumors, brain bleeds, or other abnormality. This is likely a peripheral vertigo which is reassuring. You will be sent home with a trial of medicine called meclizine which may help if you have any future episodes of this. Please return to the emergency department if you develop any changes in your vision, worsening headache, worsening dizziness or unsteadiness, chest pain or shortness of breath. Otherwise, you should plan to follow-up with your primary care provider as discussed.

## 2022-12-15 NOTE — ED NOTES
I have reviewed discharge instructions with the patient. The patient verbalized understanding. Patient left ED via Discharge Method: ambulatory to Home with self. Opportunity for questions and clarification provided. Patient given e scripts. To continue your aftercare when you leave the hospital, you may receive an automated call from our care team to check in on how you are doing. This is a free service and part of our promise to provide the best care and service to meet your aftercare needs.  If you have questions, or wish to unsubscribe from this service please call [phone]. Thank you for Choosing our [address] [place] [address] Emergency Department.         Eliana Peter RN  12/14/22 [id number]

## 2022-12-16 ENCOUNTER — OFFICE VISIT (OUTPATIENT)
Dept: ORTHOPEDIC SURGERY | Age: 42
End: 2022-12-16
Payer: COMMERCIAL

## 2022-12-16 DIAGNOSIS — R29.2 GENERALIZED HYPERREFLEXIA: ICD-10-CM

## 2022-12-16 DIAGNOSIS — M50.30 DDD (DEGENERATIVE DISC DISEASE), CERVICAL: Primary | ICD-10-CM

## 2022-12-16 DIAGNOSIS — M48.02 CERVICAL SPINAL STENOSIS: ICD-10-CM

## 2022-12-16 PROCEDURE — 99213 OFFICE O/P EST LOW 20 MIN: CPT | Performed by: PHYSICIAN ASSISTANT

## 2022-12-16 NOTE — LETTER
[address]                                                     SANG MANNING  1980  MRN [id number]                                              ROOM NUMBER______      Radiographic Studies:    Cervical MRI      Thoracic MRI         Lumbar MRI          Pelvis MRI        CONTRAST    CT Myelogram: _______________   NCS/EMG ________________ ( UE  /  LE )     MRI of ___________________          Other: ____________________      Injections:    KNEE    HIP  Depomedrol _____ mg Euflexxa _____    _______________ TFESI/SNRB  _______________ SI Joint  _______________ MABEL    _______________ Facet  _______________Piriformis/ Sciatica      Medications:    Oral Steroids _______________  NSAIDS _______________    Muscle Relaxers _______________  Neurontin/Lyrica _______________    Pain Medicine _______________  Other _______________                       Physical Therapy:    Lumbar     Thoracic      Cervical     Hip       Knee       Shoulder               Traction          Ultrasound          Dry Needling      Referral:    Pain referral:  [facility]   [facility]   Other: ______________________________    Follow-up/ Refer__________________________________________________    Authorization to hold blood thinners:___________________________________

## 2022-12-16 NOTE — PROGRESS NOTES
Name: Dieudonne Ria  YOB: 1980  Gender: female  MRN: [id number]    CC: Neck Pain (Follow up after PT)         HPI: This is a 36y.o.  year old female who I have seen in the past for both cervical and lumbar spine pain. Her cervical spine MRI 2019 does reveal mild stenosis C3-4 and C5-6 had moderate stenosis. There was no cord signal change and no significant foraminal stenosis. [facility] has been very effective for her. She presents today with some new symptoms more paresthesias that have occurred all over her body upper extremity and lower extremity. She is also having episodes of vertigo and increased pain in the thoracic spine. She has felt unsteady gait. She went to the emergency department with some vertigo and paresthesias even along her face. They checked a CT of her head which showed no intracranial abnormality. She wanted to follow-up on neck pathology. She does still get some left radicular pain in her leg and prior MRI scan of the lumbar spine has shown no pathology of significance. No bladder or bowel incontinence. She does feel unsteady at times. No flowsheet data found. Allergies   Allergen Reactions    Penicillin G Other (See Comments)     As a child and has taken as an adult without difficulty    Sulfa Antibiotics Other (See Comments)     Hives.  Face and throat swelling      Current Outpatient Medications   Medication Sig Dispense Refill    meclizine (ANTIVERT) 25 MG tablet Take 1 tablet by mouth 3 times daily as needed for Dizziness 15 tablet 0    lansoprazole (PREVACID) 30 MG delayed release capsule Take 30 mg by mouth daily      cyanocobalamin 1000 MCG tablet 1,000 mcg      B COMPLEX-C PO Take 1 tablet by mouth daily      magnesium oxide (MAG-OX) 400 MG tablet Take 400 mg by mouth daily      Digestive Enzymes (PAPAYA AND ENZYMES PO) Take by mouth      acetaminophen (TYLENOL) 325 MG tablet Take by mouth every 4 hours as needed vitamin D (CHOLECALCIFEROL) 25 MCG (1000 UT) TABS tablet Take by mouth daily      cyclobenzaprine (FLEXERIL) 10 MG tablet Take 10 mg by mouth 3 times daily as needed       No current facility-administered medications for this visit. Past Surgical History:   Procedure Laterality Date    BREAST BIOPSY Right 2021    BREAST BIOPSY Right 2021    RIGHT BREAST NEEDLE LOCALIZED BIOPSY PREOP 0830/ LOC 1000/ SURGERY 1200 performed by Vivi Bee DO at [address] [facility] LUMPECTOMY Right 2000    fibroadenoma     SECTION      2    MASTECTOMY Right 2021    RIGHT BREAST PARTIAL MASTECTOMY performed by Vivi Bee DO at [address] Right 2021    US BREAST NEEDLE BIOPSY RIGHT 2021 [facility]    US GUIDED NEEDLE LOC OF RIGHT BREAST Right 2021    US GUIDED NEEDLE LOC OF RIGHT BREAST 2021 [facility]      Patient Active Problem List    Diagnosis Date Noted    Elevated liver enzymes 2022    Spondylosis of cervical region without myelopathy or radiculopathy 2020    Gastroesophageal reflux disease without esophagitis 10/24/2016      Tobacco Use: Medium Risk    Smoking Tobacco Use: Former    Smokeless Tobacco Use: Never    Passive Exposure: Not on file      Alcohol Use: Not on file     EXAM:  CERVICAL SPINE:  Inspection of the neck reveals no evidence of rash or skin lesion. Examination of the cervical spine reveals no evidence of sagittal or coronal plane deformity, no palpable tenderness or step deformity, and decreased ROM    Patient is is able to toe walk and heel walk.      Sensory testing reveals intact sensation to light touch in the distribution of the C5-T1 dermatomes bilaterally  Reflexes     Right Left   Biceps (C5) 3 3   Brachio radialis (C6) 3 3    Triceps (C7) 3 3     Napoles's is positive    positive clonus on the right  Lhermitte's Sign positive  Inverted radial reflex is positive Strength testing in the upper extremity reveals the following based on the 5 point grading scale:     Delt(C5) Bicep(C6) WE(C6) Tricep (C7) WF(C7) (C8) Int (T1)   Right 5 5 5 5 5 5 4   Left 5 5 5 5 5 5 4     Pulses are palpable over bilateral radial arteries. Radiographic Studies:   No imaging today      Assessment/Plan:      ICD-10-CM    1. DDD (degenerative disc disease), cervical  M50.30 MRI CERVICAL SPINE WO CONTRAST      2. Cervical spinal stenosis  M48.02 MRI CERVICAL SPINE WO CONTRAST      3. Generalized hyperreflexia  R29.2 MRI CERVICAL SPINE WO CONTRAST         This patient's clinical history and physical exam is [place] for progression of her cervical stenosis. We discussed this could be some progression of her cervical stenosis causing upper and lower extremity paresthesias and her vertigo. Other etiologies may be ENT or neurological.  I am recommending new MRI scan of the cervical spine to evaluate anatomy and if this does not show significant progression, I would recommend referral to neurology. -----MRI: A MRI was ordered to confirm the diagnosis and assess the severity. No orders of the defined types were placed in this encounter. Orders Placed This Encounter   Procedures    MRI CERVICAL SPINE WO CONTRAST        4 This is a chronic illness/condition with exacerbation and progression      No follow-ups on file. Heaven Mcmahan PA-C  12/27/22      Elements of this note were created using speech recognition software. As such, errors of speech recognition may be present.

## 2022-12-18 ENCOUNTER — PATIENT MESSAGE (OUTPATIENT)
Dept: ORTHOPEDIC SURGERY | Age: 42
End: 2022-12-18

## 2022-12-20 ENCOUNTER — OFFICE VISIT (OUTPATIENT)
Dept: INTERNAL MEDICINE CLINIC | Facility: CLINIC | Age: 42
End: 2022-12-20
Payer: COMMERCIAL

## 2022-12-20 VITALS
WEIGHT: 119 LBS | TEMPERATURE: 98.4 F | BODY MASS INDEX: 22.47 KG/M2 | HEART RATE: 96 BPM | HEIGHT: 61 IN | OXYGEN SATURATION: 99 % | DIASTOLIC BLOOD PRESSURE: 81 MMHG | SYSTOLIC BLOOD PRESSURE: 116 MMHG

## 2022-12-20 DIAGNOSIS — R42 VERTIGO: Primary | ICD-10-CM

## 2022-12-20 DIAGNOSIS — Z09 HOSPITAL DISCHARGE FOLLOW-UP: ICD-10-CM

## 2022-12-20 DIAGNOSIS — M50.30 DDD (DEGENERATIVE DISC DISEASE), CERVICAL: ICD-10-CM

## 2022-12-20 PROCEDURE — 99214 OFFICE O/P EST MOD 30 MIN: CPT | Performed by: NURSE PRACTITIONER

## 2022-12-20 RX ORDER — MECLIZINE HYDROCHLORIDE 25 MG/1
25 TABLET ORAL 3 TIMES DAILY PRN
Qty: 15 TABLET | Refills: 0 | Status: SHIPPED | OUTPATIENT
Start: 2022-12-20 | End: 2022-12-30

## 2022-12-20 ASSESSMENT — PATIENT HEALTH QUESTIONNAIRE - PHQ9
SUM OF ALL RESPONSES TO PHQ QUESTIONS 1-9: 0
SUM OF ALL RESPONSES TO PHQ QUESTIONS 1-9: 0
1. LITTLE INTEREST OR PLEASURE IN DOING THINGS: 0
SUM OF ALL RESPONSES TO PHQ9 QUESTIONS 1 & 2: 0
SUM OF ALL RESPONSES TO PHQ QUESTIONS 1-9: 0
SUM OF ALL RESPONSES TO PHQ QUESTIONS 1-9: 0
2. FEELING DOWN, DEPRESSED OR HOPELESS: 0

## 2022-12-20 ASSESSMENT — ENCOUNTER SYMPTOMS
NAUSEA: 0
COUGH: 0
SHORTNESS OF BREATH: 0
VOMITING: 0

## 2022-12-20 NOTE — PROGRESS NOTES
[facility]  Office Visit Note    Subjective:  Chief Complaint   Patient presents with    Dizziness       Patient ID: Larissa Barton is a 36 y.o. female presenting to the office for the above. 36year old female for ER follow up. PCP is Dr. Alida Shelton. She went to the ER on 12/14 with dizziness, room spinning, BUE/BLE paresthesias. CT head, ECG, and labs reassuring. Exam showed fatiguable horizontal nystagmus, reproducible symptoms. She was diagnosed with vertigo and given trial of meclizine. She then saw her orthopedist on 12/16; she has cervical stenosis and has done PT in the past.  No red flag symptoms. Orthopedics is getting MRI cervical spine; if this does not show significant progression of her cervical disease, they plan to refer to neurology. --She is continuing to have intermittent vertigo symptoms. Has taken the meclizine at night a few times, with mild relief. Continue prn meclizine. --Refer to vestibular rehab. She is going to let me know where she wants to go and if she needs referral; she is going to check with her current PT to see if they can do it. --Keep follow ups with orthopedics regarding imaging and possible neurology referral.   --Advised of symptoms that would require reevaluation, or that would require immediate medical attention in the ER; patient expresses understanding. History: Allergies   Allergen Reactions    Penicillin G Other (See Comments)     As a child and has taken as an adult without difficulty    Sulfa Antibiotics Other (See Comments)     Hives. Face and throat swelling       Past Medical History:   Diagnosis Date    Allergic rhinitis 10/24/2016    Anxiety     Bilateral ovarian cysts 01/20/2020    Chronic superficial gastritis without bleeding 11/11/2016 11-8-2016 - EGD - gastritis moderate gastritis in the body -biopsied.  Dr. Marielle Resendiz     Gastroesophageal reflux disease 10/24/2016    Hiatal hernia     per pt report found as a child    Intraductal papilloma of breast, right 2021    Spondylosis of cervical region without myelopathy or radiculopathy 2020    MRI cervical spine  - POA showed some degenerative disc disease and osteophytes with mild/moderate spinal stenosis       Past Surgical History:   Procedure Laterality Date    BREAST BIOPSY Right 2021    BREAST BIOPSY Right 2021    RIGHT BREAST NEEDLE LOCALIZED BIOPSY PREOP 0830/ LOC 1000/ SURGERY 1200 performed by Hussain Hernandez DO at [address] [facility] LUMPECTOMY Right 2000    fibroadenoma     SECTION      2    MASTECTOMY Right 2021    RIGHT BREAST PARTIAL MASTECTOMY performed by Hussain Hernandez DO at [address] [facility]. BIOPSY RIGHT Right 2021    US BREAST NEEDLE BIOPSY RIGHT 2021 [facility]    US GUIDED NEEDLE LOC OF RIGHT BREAST Right 2021    US GUIDED NEEDLE LOC OF RIGHT BREAST 2021 [facility]       Family History   Problem Relation Age of Onset    Cancer Mother         renal cell    Neuropathy Mother     Asthma Mother     Kidney Disease Mother     Breast Cancer Mother     Hypertension Mother     Parkinson's Disease Mother     Hypertension Father     Pacemaker Father     Cancer Father     Thyroid Disease Sister     Other Sister     Asthma Brother     Drug Abuse Brother     Hypertension Maternal Grandmother     Cancer Maternal Grandmother     Glaucoma Maternal Grandmother     Colon Cancer Maternal Grandfather     Heart Disease Paternal Grandmother     Stroke Paternal Grandmother     Heart Disease Paternal Grandfather        Social History     Tobacco Use   Smoking Status Former    Packs/day: 1.00    Types: Cigarettes    Start date: 1998    Quit date: 2010    Years since quittin.9   Smokeless Tobacco Never       Social History     Substance and Sexual Activity   Alcohol Use Yes       Current Outpatient Medications   Medication Sig Dispense Refill meclizine (ANTIVERT) 25 MG tablet Take 1 tablet by mouth 3 times daily as needed for Dizziness 15 tablet 0    lansoprazole (PREVACID) 30 MG delayed release capsule Take 30 mg by mouth daily      cyanocobalamin 1000 MCG tablet 1,000 mcg      B COMPLEX-C PO Take 1 tablet by mouth daily      magnesium oxide (MAG-OX) 400 MG tablet Take 400 mg by mouth daily      Digestive Enzymes (PAPAYA AND ENZYMES PO) Take by mouth      acetaminophen (TYLENOL) 325 MG tablet Take by mouth every 4 hours as needed      vitamin D (CHOLECALCIFEROL) 25 MCG (1000 UT) TABS tablet Take by mouth daily      cyclobenzaprine (FLEXERIL) 10 MG tablet Take 10 mg by mouth 3 times daily as needed       No current facility-administered medications for this visit. Review of Systems:  Review of Systems   Constitutional:  Negative for fever and unexpected weight change. Eyes:  Negative for visual disturbance. Respiratory:  Negative for cough and shortness of breath. Cardiovascular:  Negative for chest pain and palpitations. Gastrointestinal:  Negative for nausea and vomiting. Musculoskeletal:  Positive for neck pain. Skin:  Negative for pallor. Neurological:  Positive for dizziness and numbness (paresthesias BUE/BLE). Negative for tremors, seizures, syncope, speech difficulty and weakness. Psychiatric/Behavioral:  Negative for dysphoric mood. The patient is not nervous/anxious. See HPI for other pertinent positives and negatives. Objective:  Vitals:    12/20/22 1039   BP: 116/81   Pulse: 96   Temp: 98.4 °F (36.9 °C)   TempSrc: Temporal   SpO2: 99%   Weight: 119 lb (54 kg)   Height: 5' 1\" (1.549 m)       Body mass index is 22.48 kg/m². Physical Exam  Vitals and nursing note reviewed. Constitutional:       General: She is not in acute distress. Appearance: Normal appearance. She is not ill-appearing or diaphoretic. HENT:      Head: Normocephalic and atraumatic. Eyes:      General: No scleral icterus. Right eye: No discharge. Left eye: No discharge. Cardiovascular:      Rate and Rhythm: Normal rate. Pulmonary:      Effort: Pulmonary effort is normal. No respiratory distress. Breath sounds: Normal breath sounds. Musculoskeletal:      Cervical back: No rigidity. Skin:     General: Skin is warm and dry. Neurological:      Mental Status: She is alert and oriented to person, place, and time. Psychiatric:         Mood and Affect: Mood normal.         Speech: Speech normal.         Behavior: Behavior normal.                Lab Results   Component Value Date    WBC 6.1 12/14/2022    HGB 14.7 12/14/2022    HCT 43.2 12/14/2022    MCV 92.5 12/14/2022     12/14/2022   ,   Lab Results   Component Value Date/Time     12/14/2022 05:46 PM    K 4.0 12/14/2022 05:46 PM     12/14/2022 05:46 PM    CO2 27 12/14/2022 05:46 PM    BUN 13 12/14/2022 05:46 PM    CREATININE 0.75 12/14/2022 05:46 PM    GLUCOSE 99 12/14/2022 05:46 PM    CALCIUM 9.2 12/14/2022 05:46 PM    ,   Lab Results   Component Value Date    ALT 19 12/14/2022    AST 11 (L) 12/14/2022    ALKPHOS 42 (L) 12/14/2022    BILITOT 1.4 (H) 12/14/2022   ,    PHQ-9  12/20/2022   Little interest or pleasure in doing things 0   Feeling down, depressed, or hopeless 0   Trouble falling or staying asleep, or sleeping too much -   Feeling tired or having little energy -   Poor appetite or overeating -   Feeling bad about yourself - or that you are a failure or have let yourself or your family down -   Trouble concentrating on things, such as reading the newspaper or watching television -   Moving or speaking so slowly that other people could have noticed.  Or the opposite - being so fidgety or restless that you have been moving around a lot more than usual -   Thoughts that you would be better off dead, or of hurting yourself in some way -   PHQ-2 Score 0   PHQ-9 Total Score 0   If you checked off any problems, how difficult have these problems made it for you to do your work, take care of things at home, or get along with other people? -        Assessment/Plan:      Health Maintenance Due   Topic Date Due    Cervical cancer screen  Never done    COVID-19 Vaccine (4 - Booster for Moderna series) 02/06/2022          Felipe Rivera was seen today for dizziness. Diagnoses and all orders for this visit:    Vertigo  -     meclizine (ANTIVERT) 25 MG tablet; Take 1 tablet by mouth 3 times daily as needed for Dizziness    DDD (degenerative disc disease), cervical    Hospital discharge follow-up    Encouraged to contact office with any concerns prior to next visit. Return if symptoms worsen or fail to improve, for Next scheduled visit.     Marixa Ped, APRN - CNP

## 2022-12-21 ENCOUNTER — TELEPHONE (OUTPATIENT)
Dept: ORTHOPEDIC SURGERY | Age: 42
End: 2022-12-21

## 2022-12-21 NOTE — TELEPHONE ENCOUNTER
She states she sent a message in [employer] but hasn't heard back again. Please see the message and respond to let her know.

## 2022-12-27 RX ORDER — METHYLPREDNISOLONE 4 MG/1
TABLET ORAL
Qty: 1 KIT | Refills: 0 | Status: SHIPPED | OUTPATIENT
Start: 2022-12-27

## 2022-12-30 DIAGNOSIS — M50.30 DDD (DEGENERATIVE DISC DISEASE), CERVICAL: ICD-10-CM

## 2022-12-30 DIAGNOSIS — M48.02 CERVICAL SPINAL STENOSIS: Primary | ICD-10-CM

## 2022-12-30 DIAGNOSIS — R29.2 GENERALIZED HYPERREFLEXIA: ICD-10-CM

## 2022-12-30 DIAGNOSIS — M47.812 SPONDYLOSIS OF CERVICAL REGION WITHOUT MYELOPATHY OR RADICULOPATHY: ICD-10-CM

## 2022-12-30 DIAGNOSIS — M48.02 SPINAL STENOSIS, CERVICAL REGION: ICD-10-CM

## 2022-12-30 DIAGNOSIS — M50.30 OTHER CERVICAL DISC DEGENERATION, UNSPECIFIED CERVICAL REGION: ICD-10-CM

## 2022-12-30 DIAGNOSIS — M54.2 CERVICALGIA: ICD-10-CM

## 2023-01-10 ENCOUNTER — OFFICE VISIT (OUTPATIENT)
Dept: INTERNAL MEDICINE CLINIC | Facility: CLINIC | Age: 43
End: 2023-01-10
Payer: COMMERCIAL

## 2023-01-10 VITALS
HEART RATE: 95 BPM | OXYGEN SATURATION: 97 % | BODY MASS INDEX: 22.51 KG/M2 | HEIGHT: 61 IN | TEMPERATURE: 98.7 F | WEIGHT: 119.2 LBS | RESPIRATION RATE: 14 BRPM | SYSTOLIC BLOOD PRESSURE: 118 MMHG | DIASTOLIC BLOOD PRESSURE: 73 MMHG

## 2023-01-10 DIAGNOSIS — K21.9 GASTROESOPHAGEAL REFLUX DISEASE WITHOUT ESOPHAGITIS: Primary | ICD-10-CM

## 2023-01-10 DIAGNOSIS — R42 VERTIGO: ICD-10-CM

## 2023-01-10 DIAGNOSIS — R10.11 RIGHT UPPER QUADRANT PAIN: ICD-10-CM

## 2023-01-10 DIAGNOSIS — M48.02 CERVICAL SPINAL STENOSIS: ICD-10-CM

## 2023-01-10 PROCEDURE — 99214 OFFICE O/P EST MOD 30 MIN: CPT | Performed by: INTERNAL MEDICINE

## 2023-01-10 ASSESSMENT — ENCOUNTER SYMPTOMS
RESPIRATORY NEGATIVE: 1
EYES NEGATIVE: 1
ABDOMINAL PAIN: 1

## 2023-01-10 ASSESSMENT — ANXIETY QUESTIONNAIRES
6. BECOMING EASILY ANNOYED OR IRRITABLE: 0
2. NOT BEING ABLE TO STOP OR CONTROL WORRYING: 0
1. FEELING NERVOUS, ANXIOUS, OR ON EDGE: 0
3. WORRYING TOO MUCH ABOUT DIFFERENT THINGS: 0
4. TROUBLE RELAXING: 0
7. FEELING AFRAID AS IF SOMETHING AWFUL MIGHT HAPPEN: 0
5. BEING SO RESTLESS THAT IT IS HARD TO SIT STILL: 0
GAD7 TOTAL SCORE: 0
IF YOU CHECKED OFF ANY PROBLEMS ON THIS QUESTIONNAIRE, HOW DIFFICULT HAVE THESE PROBLEMS MADE IT FOR YOU TO DO YOUR WORK, TAKE CARE OF THINGS AT HOME, OR GET ALONG WITH OTHER PEOPLE: NOT DIFFICULT AT ALL

## 2023-01-10 ASSESSMENT — PATIENT HEALTH QUESTIONNAIRE - PHQ9
1. LITTLE INTEREST OR PLEASURE IN DOING THINGS: 0
SUM OF ALL RESPONSES TO PHQ QUESTIONS 1-9: 0
SUM OF ALL RESPONSES TO PHQ QUESTIONS 1-9: 0
SUM OF ALL RESPONSES TO PHQ9 QUESTIONS 1 & 2: 0
SUM OF ALL RESPONSES TO PHQ QUESTIONS 1-9: 0
SUM OF ALL RESPONSES TO PHQ QUESTIONS 1-9: 0
2. FEELING DOWN, DEPRESSED OR HOPELESS: 0

## 2023-01-10 NOTE — PROGRESS NOTES
Pascual Thompson D.O.   [place] [address]  Tel: [phone]    Office Visit: Follow Up     Patient Name: Alma Alfonso   :  1980   MRN:   [id number]      Today's Date: 01/10/23 1:41 PM    Subjective     The patient is a 42 y.o.-year-old female who presents for follow-up.    Last visit:  -Patient is a 41 year old female with no acute complaints today. She is up-to-date on pap smear and mammogram. She has history of elevated liver enzymes and we will check them today.   -check lipid panel and basic labs  -continue current treatment plan as prescribed previously   -she will complete Hep B and shingles  -she will get her flushot on Friday at school     Today:   Patient was seen by Dr. Phil Staley on 2022 for worsening abdominal pain.  At that time she had a urinalysis positive for nitrates and was treated with Macrobid.  Culture was positive for E. coli.  She was advised to follow-up with GI and see if she can get an earlier appointment.  She is able to see her GI doctor on 2022 and was given a HIDA scan order.    HIDA scan is on Friday.     Patient was also seen in the ER on 2022 for 2 dizzy spells and nausea while at work.  She states she also had pins-and-needles feelings in her bilateral arms and legs and feet which she thought started after her third dose of the antibiotic for the UTI.  CT scan of her head was unremarkable.    She also followed up with her orthopedic surgeon who told her she had cervical stenosis and scheduled an MRI for mid January.    MRI today revealed:   Intrinsic canal narrowing within the cervical spine with superimposed mild   degenerative disc disease contributing to mild spinal canal stenoses at C3-C4   through C6-C7, unchanged from 2019.         The patient saw Kimberly Staley on 2022 for dizziness and vertigo.  At this time she was given more meclizine and referred to vestibular  rehab.    She states her pins and needles feelings have gotten a lot better. She states her sister's  had low B12 and had similar symptoms. She thinks her dizziness is related to her menstrual periods. Review of Systems   Constitutional: Negative. HENT: Negative. Eyes: Negative. Respiratory: Negative. Cardiovascular: Negative. Gastrointestinal:  Positive for abdominal pain. Genitourinary: Negative. Musculoskeletal:  Positive for neck pain. Skin: Negative. Neurological:  Positive for dizziness and numbness. Psychiatric/Behavioral: Negative. Past Medical History:   Diagnosis Date    Allergic rhinitis 10/24/2016    Anxiety     Bilateral ovarian cysts 2020    Chronic superficial gastritis without bleeding 2016 - EGD - gastritis moderate gastritis in the body -biopsied.  Dr. Memo Hernandes     Gastroesophageal reflux disease 10/24/2016    Hiatal hernia     per pt report found as a child    Intraductal papilloma of breast, right 2021    Spondylosis of cervical region without myelopathy or radiculopathy 2020    MRI cervical spine  - POA showed some degenerative disc disease and osteophytes with mild/moderate spinal stenosis     Social History     Socioeconomic History    Marital status:      Spouse name: Not on file    Number of children: Not on file    Years of education: Not on file    Highest education level: Not on file   Occupational History    Not on file   Tobacco Use    Smoking status: Former     Packs/day: 1.00     Types: Cigarettes     Start date: 1998     Quit date: 2010     Years since quittin.0    Smokeless tobacco: Never   Substance and Sexual Activity    Alcohol use: Yes    Drug use: No    Sexual activity: Not on file   Other Topics Concern    Not on file   Social History Narrative    Lives with  and children  and      Social Determinants of Health     Financial Resource Strain: Not on file   Food Insecurity: Not on file   Transportation Needs: Not on file   Physical Activity: Insufficiently Active    Days of Exercise per Week: 3 days    Minutes of Exercise per Session: 20 min   Stress: Not on file   Social Connections: Not on file   Intimate Partner Violence: Not At Risk    Fear of Current or Ex-Partner: No    Emotionally Abused: No    Physically Abused: No    Sexually Abused: No   Housing Stability: Not on file         Current Outpatient Medications   Medication Sig    lansoprazole (PREVACID) 30 MG delayed release capsule Take 30 mg by mouth daily    cyanocobalamin 1000 MCG tablet 1,000 mcg    B COMPLEX-C PO Take 1 tablet by mouth daily    magnesium oxide (MAG-OX) 400 MG tablet Take 400 mg by mouth daily    vitamin D (CHOLECALCIFEROL) 25 MCG (1000 UT) TABS tablet Take by mouth daily    methylPREDNISolone (MEDROL DOSEPACK) 4 MG tablet Take by mouth as directed. (Patient not taking: Reported on 1/10/2023)    Digestive Enzymes (PAPAYA AND ENZYMES PO) Take by mouth (Patient not taking: Reported on 1/10/2023)    acetaminophen (TYLENOL) 325 MG tablet Take by mouth every 4 hours as needed (Patient not taking: Reported on 1/10/2023)    cyclobenzaprine (FLEXERIL) 10 MG tablet Take 10 mg by mouth 3 times daily as needed (Patient not taking: Reported on 1/10/2023)     No current facility-administered medications for this visit. Objective     Vitals:    01/10/23 1316   BP: 118/73   Site: Left Upper Arm   Position: Sitting   Cuff Size: Small Adult   Pulse: 95   Resp: 14   Temp: 98.7 °F (37.1 °C)   TempSrc: Temporal   SpO2: 97%   Weight: 119 lb 3.2 oz (54.1 kg)   Height: 5' 1\" (1.549 m)      Physical Exam:  Constitutional: Appears well kempt. Alert/oriented x3. In no acute distress. Head: Normocephalic No trauma. No deformity. Cardiac: Heart with normal rate/rhythm. No murmurs. No gallops. Pulses normal.   Pulmonary: Lungs clear to auscultation bilaterally.  In no respiratory distress. No wheezing. No rales. No rhonci. Psychiatric: Normal thought content. Normal behavior. Normal judgment. Recommendations     Assessment:  Patient Active Problem List   Diagnosis    Gastroesophageal reflux disease without esophagitis    Spondylosis of cervical region without myelopathy or radiculopathy    Elevated liver enzymes    Vertigo      Status of above conditions: stable     Plan:  -PT for cervical stenosis and neck pain   -she will discuss  dizziness/menstrual migraines with OBGYN  -she will complete HIDA scan and follow up with GI for abdominal pain   -follow up with Orthopedic Surgeon, on Thursday  -She may continue using meclizine for vertigo as needed  -I recommend she try salt restriction diet as she has complained about some tinnitus along with her vertigo and this may represent Ménière's disease  -Recommend suboccipital release and instructed patient on how she can perform this at home with her  or by herself  -We had a long discussion about anxiety and conversion disorder and about how she is to see therapy for her anxiety and that this really helped and I recommend that she may be explore this in the future.  -Patient was very appreciative of today's visit and will take today's recommendations at heart and we will revisit in 6 weeks to see if there is anything else we need to take    -Previous medical records and/or labs/tests available to me reviewed, any records outstanding not available requested  -The risks, benefits, and medical necessity of all medications, tests labs, and any other orders that were ordered at today's visit were discussed with the patient including all elective or patient requested orders. Decision to order or not order tests or based on this risk/benefit ratio and medical necessity.  -The patient expresses understanding of the plan as I've explained it to her and is in agreement with the current plan.     Follow Up: 6 weeks     Total Time: 30 minutes (chart reviewing and in-exam time)    Signed: Robert Lindsey D.O.  01/10/23  1:41 PM

## 2023-01-12 ENCOUNTER — OFFICE VISIT (OUTPATIENT)
Dept: ORTHOPEDIC SURGERY | Age: 43
End: 2023-01-12
Payer: COMMERCIAL

## 2023-01-12 DIAGNOSIS — M50.30 DDD (DEGENERATIVE DISC DISEASE), CERVICAL: Primary | ICD-10-CM

## 2023-01-12 DIAGNOSIS — M48.02 CERVICAL SPINAL STENOSIS: ICD-10-CM

## 2023-01-12 PROCEDURE — 99214 OFFICE O/P EST MOD 30 MIN: CPT | Performed by: PHYSICIAN ASSISTANT

## 2023-01-12 NOTE — PROGRESS NOTES
Name: Estella Lema  YOB: 1980  Gender: female  MRN: [id number]    CC: Neck Pain (MRI results)         HPI: This is a 36y.o.  year old female who I have seen in the past for both cervical and lumbar spine pain. Her cervical spine MRI 2019 does reveal mild stenosis C3-4 and C5-6 had moderate stenosis. There was no cord signal change and no significant foraminal stenosis. [place] physical therapy has been very effective for her. She presents today with some new symptoms more paresthesias that have occurred all over her body upper extremity and lower extremity. She is also having episodes of vertigo and increased pain in the thoracic spine. She has felt unsteady gait. She went to the emergency department with some vertigo and paresthesias even along her face. They checked a CT of her head which showed no intracranial abnormality. She wanted to follow-up on neck pathology. She does still get some left radicular pain in her leg and prior MRI scan of the lumbar spine has shown no pathology of significance. No bladder or bowel incontinence. She does feel unsteady at times. We ordered A new MRI scan of the cervical spine. No flowsheet data found. Allergies   Allergen Reactions    Penicillin G Other (See Comments)     As a child and has taken as an adult without difficulty    Sulfa Antibiotics Other (See Comments)     Hives. Face and throat swelling      Current Outpatient Medications   Medication Sig Dispense Refill    methylPREDNISolone (MEDROL DOSEPACK) 4 MG tablet Take by mouth as directed.  (Patient not taking: Reported on 1/10/2023) 1 kit 0    lansoprazole (PREVACID) 30 MG delayed release capsule Take 30 mg by mouth daily      cyanocobalamin 1000 MCG tablet 1,000 mcg      B COMPLEX-C PO Take 1 tablet by mouth daily      magnesium oxide (MAG-OX) 400 MG tablet Take 400 mg by mouth daily      Digestive Enzymes (PAPAYA AND ENZYMES PO) Take by mouth (Patient not taking: Reported on 1/10/2023)      acetaminophen (TYLENOL) 325 MG tablet Take by mouth every 4 hours as needed (Patient not taking: Reported on 1/10/2023)      vitamin D (CHOLECALCIFEROL) 25 MCG (1000 UT) TABS tablet Take by mouth daily      cyclobenzaprine (FLEXERIL) 10 MG tablet Take 10 mg by mouth 3 times daily as needed (Patient not taking: Reported on 1/10/2023)       No current facility-administered medications for this visit. Past Surgical History:   Procedure Laterality Date    BREAST BIOPSY Right 2021    BREAST BIOPSY Right 2021    RIGHT BREAST NEEDLE LOCALIZED BIOPSY PREOP 0830/ LOC 1000/ SURGERY 1200 performed by Sara Nielsen DO at [address] [facility] LUMPECTOMY Right 2000    fibroadenoma     SECTION      2    MASTECTOMY Right 2021    RIGHT BREAST PARTIAL MASTECTOMY performed by Sara Nielsen DO at [address] [facility] Right 2021    US BREAST NEEDLE BIOPSY RIGHT 2021 [facility]    US GUIDED NEEDLE LOC OF RIGHT BREAST Right 2021    US GUIDED NEEDLE LOC OF RIGHT BREAST 2021 [facility]      Patient Active Problem List    Diagnosis Date Noted    Vertigo 01/10/2023    Right upper quadrant pain 01/10/2023    Cervical spinal stenosis 01/10/2023    Elevated liver enzymes 2022    Spondylosis of cervical region without myelopathy or radiculopathy 2020    Gastroesophageal reflux disease without esophagitis 10/24/2016      Tobacco Use: Medium Risk    Smoking Tobacco Use: Former    Smokeless Tobacco Use: Never    Passive Exposure: Not on file      Alcohol Use: Not on file     EXAM:  CERVICAL SPINE:  Inspection of the neck reveals no evidence of rash or skin lesion. Examination of the cervical spine reveals no evidence of sagittal or coronal plane deformity, no palpable tenderness or step deformity, and decreased ROM    Patient is is able to toe walk and heel walk.      Sensory testing reveals intact sensation to light touch in the distribution of the C5-T1 dermatomes bilaterally  Reflexes     Right Left   Biceps (C5) 3 3   Brachio radialis (C6) 3 3    Triceps (C7) 3 3     Napoles's is positive    positive clonus on the right  Lhermitte's Sign positive  Inverted radial reflex is positive        Strength testing in the upper extremity reveals the following based on the 5 point grading scale:     Delt(C5) Bicep(C6) WE(C6) Tricep (C7) WF(C7) (C8) Int (T1)   Right 5 5 5 5 5 5 4   Left 5 5 5 5 5 5 4     Pulses are palpable over bilateral radial arteries. Radiographic Studies:   MRI Result (most recent):  MRI CERVICAL SPINE WO CONTRAST 01/10/2023    Narrative  Exam: MRI Cervical spine without contrast.  Indication: Neck pain  Comparison: Cervical spine radiographs, 3/2/2021. MRI cervical spine, 2/5/2019. Technique: Multiplanar multisequence imaging of the cervical spine without  contrast.    FINDINGS:  Straightening of the normal lordosis. No spondylolisthesis. Vertebral body  heights are preserved. No fracture or aggressive marrow signal abnormality. The  spinal cord is normal in morphology and signal intensity. Perivertebral soft  tissues are unremarkable. Multilevel disc desiccation with minimal disc space  narrowing at C5-C6. There is intrinsic canal narrowing within the cervical  spine. C2-C3: No spinal canal or neuroforaminal stenosis. C3-C4: Shallow disc osteophyte complex with right greater than left  uncovertebral hypertrophy. Mild spinal canal stenosis without neuroforaminal  stenosis. Findings are unchanged. C4-C5: Shallow disc osteophyte complex with mild spinal canal stenosis. No  neuroforaminal stenosis. Findings are unchanged. C5-C6: Disc osteophyte complex with bilateral uncovertebral hypertrophy. Mild  spinal canal stenosis. No neuroforaminal stenoses. Findings are unchanged. C6-C7: Minimal disc osteophyte complex with central annular fissure. Mild spinal  canal stenosis.  No neuroforaminal stenosis. Findings are unchanged. C7-T1: No spinal canal or neuroforaminal stenosis. Impression  Intrinsic canal narrowing within the cervical spine with superimposed mild  degenerative disc disease contributing to mild spinal canal stenoses at C3-C4  through C6-C7, unchanged from 2019. I have independently reviewed the MRI scan of the cervical spine. She does have some mild stenosis multiple levels but there is been no change from the prior MRI scan 2019. No cord edema. No significant stenosis noted. Assessment/Plan:      ICD-10-CM    1. DDD (degenerative disc disease), cervical  M50.30       2. Cervical spinal stenosis  M48.02            I reassured her she did not have progression of her cervical stenosis. I do not see anything that would be surgically addressable in the cervical spine at this time. Cannot explain her symptoms based on cervical etiology. Other etiologies may be ENT or neurological.  I am recommending new MRI scan of the cervical spine to evaluate anatomy and if this does not show significant progression, I would recommend referral to neurology. -----Observation only:  The patient will be treated observantly with self directed symptomatic care. Instructions were given to follow up if there is any neurologic or functional decline. Continue with physical therapy    No orders of the defined types were placed in this encounter. No orders of the defined types were placed in this encounter. 4 This is a chronic illness/condition with exacerbation and progression      Return if symptoms worsen or fail to improve. Micky Garcia PA-C  01/12/23      Elements of this note were created using speech recognition software. As such, errors of speech recognition may be present.

## 2023-01-12 NOTE — LETTER
[address]                                                     SANG MANNING  1980  MRN [id number]                                              ROOM NUMBER______      Radiographic Studies:    Cervical MRI      Thoracic MRI         Lumbar MRI          Pelvis MRI        CONTRAST    CT Myelogram: _______________   NCS/EMG ________________ ( UE  /  LE )     MRI of ___________________          Other: ____________________      Injections:    KNEE    HIP  Depomedrol _____ mg Euflexxa _____    _______________ TFESI/SNRB  _______________ SI Joint  _______________ MABEL    _______________ Facet  _______________Piriformis/ Sciatica      Medications:    Oral Steroids _______________  NSAIDS _______________    Muscle Relaxers _______________  Neurontin/Lyrica _______________    Pain Medicine _______________  Other _______________                       Physical Therapy:    Lumbar     Thoracic      Cervical     Hip       Knee       Shoulder               Traction          Ultrasound          Dry Needling      Referral:    Pain referral:  [facility]   [facility]   Other: ______________________________    Follow-up/ Refer__________________________________________________    Authorization to hold blood thinners:___________________________________

## 2023-01-13 ENCOUNTER — HOSPITAL ENCOUNTER (OUTPATIENT)
Dept: NUCLEAR MEDICINE | Age: 43
Discharge: HOME | End: 2023-01-13
Payer: COMMERCIAL

## 2023-01-13 DIAGNOSIS — R19.4 ALTERED BOWEL HABITS: ICD-10-CM

## 2023-01-13 DIAGNOSIS — R10.11 RIGHT UPPER QUADRANT PAIN: ICD-10-CM

## 2023-01-13 DIAGNOSIS — K21.9 CHRONIC GERD: ICD-10-CM

## 2023-01-13 PROCEDURE — 78227 HEPATOBIL SYST IMAGE W/DRUG: CPT

## 2023-01-13 PROCEDURE — 2580000003 HC RX 258: Performed by: INTERNAL MEDICINE

## 2023-01-13 PROCEDURE — 3430000000 HC RX DIAGNOSTIC RADIOPHARMACEUTICAL: Performed by: INTERNAL MEDICINE

## 2023-01-13 PROCEDURE — A9537 TC99M MEBROFENIN: HCPCS | Performed by: INTERNAL MEDICINE

## 2023-01-13 PROCEDURE — 6360000004 HC RX CONTRAST MEDICATION: Performed by: INTERNAL MEDICINE

## 2023-01-13 RX ORDER — SODIUM CHLORIDE 0.9 % (FLUSH) 0.9 %
20 SYRINGE (ML) INJECTION AS NEEDED
Status: DISCONTINUED | OUTPATIENT
Start: 2023-01-13 | End: 2023-01-17 | Stop reason: HOSPADM

## 2023-01-13 RX ORDER — KIT FOR THE PREPARATION OF TECHNETIUM TC 99M MEBROFENIN 45 MG/10ML
6.2 INJECTION, POWDER, LYOPHILIZED, FOR SOLUTION INTRAVENOUS AS NEEDED
Status: DISCONTINUED | OUTPATIENT
Start: 2023-01-13 | End: 2023-01-17 | Stop reason: HOSPADM

## 2023-01-13 RX ADMIN — MEBROFENIN 6.2 MILLICURIE: 45 INJECTION, POWDER, LYOPHILIZED, FOR SOLUTION INTRAVENOUS at 09:42

## 2023-01-13 RX ADMIN — SODIUM CHLORIDE, PRESERVATIVE FREE 20 ML: 5 INJECTION INTRAVENOUS at 09:43

## 2023-01-13 RX ADMIN — SINCALIDE 1.08 MCG: 5 INJECTION, POWDER, LYOPHILIZED, FOR SOLUTION INTRAVENOUS at 09:00

## 2023-03-16 ENCOUNTER — TELEPHONE (OUTPATIENT)
Dept: INTERNAL MEDICINE CLINIC | Facility: CLINIC | Age: 43
End: 2023-03-16

## 2023-03-16 NOTE — TELEPHONE ENCOUNTER
Attempt to contact patient to discuss their concerns was unsuccessful. Voicemail made to return call.

## 2023-03-16 NOTE — TELEPHONE ENCOUNTER
----- Message from Patricia Nicholson sent at 3/15/2023  2:03 PM EDT -----  Subject: Appointment Request    Reason for Call: Established Patient Appointment needed: Routine Existing   Condition Follow Up    QUESTIONS    Reason for appointment request? Available appointments did not meet   patient need     Additional Information for Provider? Patient is currently scheduled to see   Dr. Shazia Tapia on 3/24 but she would like to come in this Friday 3/17 if   possible.  Can we please check to see if Dr. Shazia Tapia can squeeze the patient   in this Friday at anytime.   ---------------------------------------------------------------------------  --------------  [address]  [phone]; OK to leave message on voicemail  ---------------------------------------------------------------------------  --------------  SCRIPT ANSWERS  COVID Screen: Antonia Abrams

## 2023-03-17 ENCOUNTER — OFFICE VISIT (OUTPATIENT)
Dept: INTERNAL MEDICINE CLINIC | Facility: CLINIC | Age: 43
End: 2023-03-17
Payer: COMMERCIAL

## 2023-03-17 VITALS
BODY MASS INDEX: 23.75 KG/M2 | WEIGHT: 125.8 LBS | HEART RATE: 82 BPM | TEMPERATURE: 98.3 F | HEIGHT: 61 IN | SYSTOLIC BLOOD PRESSURE: 113 MMHG | DIASTOLIC BLOOD PRESSURE: 78 MMHG | OXYGEN SATURATION: 99 %

## 2023-03-17 DIAGNOSIS — R10.11 RIGHT UPPER QUADRANT PAIN: ICD-10-CM

## 2023-03-17 DIAGNOSIS — M48.02 CERVICAL SPINAL STENOSIS: ICD-10-CM

## 2023-03-17 DIAGNOSIS — M47.812 SPONDYLOSIS OF CERVICAL REGION WITHOUT MYELOPATHY OR RADICULOPATHY: ICD-10-CM

## 2023-03-17 DIAGNOSIS — K21.9 GASTROESOPHAGEAL REFLUX DISEASE WITHOUT ESOPHAGITIS: Primary | ICD-10-CM

## 2023-03-17 DIAGNOSIS — R42 VERTIGO: ICD-10-CM

## 2023-03-17 PROCEDURE — 99214 OFFICE O/P EST MOD 30 MIN: CPT | Performed by: INTERNAL MEDICINE

## 2023-03-17 SDOH — ECONOMIC STABILITY: FOOD INSECURITY: WITHIN THE PAST 12 MONTHS, YOU WORRIED THAT YOUR FOOD WOULD RUN OUT BEFORE YOU GOT MONEY TO BUY MORE.: NEVER TRUE

## 2023-03-17 SDOH — ECONOMIC STABILITY: FOOD INSECURITY: WITHIN THE PAST 12 MONTHS, THE FOOD YOU BOUGHT JUST DIDN'T LAST AND YOU DIDN'T HAVE MONEY TO GET MORE.: NEVER TRUE

## 2023-03-17 SDOH — ECONOMIC STABILITY: HOUSING INSECURITY
IN THE LAST 12 MONTHS, WAS THERE A TIME WHEN YOU DID NOT HAVE A STEADY PLACE TO SLEEP OR SLEPT IN A SHELTER (INCLUDING NOW)?: NO

## 2023-03-17 SDOH — ECONOMIC STABILITY: TRANSPORTATION INSECURITY
IN THE PAST 12 MONTHS, HAS LACK OF TRANSPORTATION KEPT YOU FROM MEETINGS, WORK, OR FROM GETTING THINGS NEEDED FOR DAILY LIVING?: NO

## 2023-03-17 SDOH — ECONOMIC STABILITY: INCOME INSECURITY: HOW HARD IS IT FOR YOU TO PAY FOR THE VERY BASICS LIKE FOOD, HOUSING, MEDICAL CARE, AND HEATING?: NOT HARD AT ALL

## 2023-03-17 ASSESSMENT — ENCOUNTER SYMPTOMS
RESPIRATORY NEGATIVE: 1
GASTROINTESTINAL NEGATIVE: 1
EYES NEGATIVE: 1

## 2023-03-17 ASSESSMENT — ANXIETY QUESTIONNAIRES
7. FEELING AFRAID AS IF SOMETHING AWFUL MIGHT HAPPEN: 0
GAD7 TOTAL SCORE: 0
6. BECOMING EASILY ANNOYED OR IRRITABLE: 0
IF YOU CHECKED OFF ANY PROBLEMS ON THIS QUESTIONNAIRE, HOW DIFFICULT HAVE THESE PROBLEMS MADE IT FOR YOU TO DO YOUR WORK, TAKE CARE OF THINGS AT HOME, OR GET ALONG WITH OTHER PEOPLE: NOT DIFFICULT AT ALL
5. BEING SO RESTLESS THAT IT IS HARD TO SIT STILL: 0
1. FEELING NERVOUS, ANXIOUS, OR ON EDGE: 0
2. NOT BEING ABLE TO STOP OR CONTROL WORRYING: 0
4. TROUBLE RELAXING: 0
3. WORRYING TOO MUCH ABOUT DIFFERENT THINGS: 0

## 2023-03-17 ASSESSMENT — PATIENT HEALTH QUESTIONNAIRE - PHQ9
SUM OF ALL RESPONSES TO PHQ QUESTIONS 1-9: 0
1. LITTLE INTEREST OR PLEASURE IN DOING THINGS: 0
SUM OF ALL RESPONSES TO PHQ QUESTIONS 1-9: 0
SUM OF ALL RESPONSES TO PHQ9 QUESTIONS 1 & 2: 0
2. FEELING DOWN, DEPRESSED OR HOPELESS: 0

## 2023-03-17 NOTE — PROGRESS NOTES
[facility]  [address], [place] [address]  Tel: [phone]    Office Visit: Follow Up     Patient Name: Denise Grissom   :  1980   MRN:   [id number]      Today's Date: 23 11:57 AM    Subjective     The patient is a 36y.o.-year-old female who presents for follow-up. Acid reflux with chronic abdominal pain and right upper quadrant pain  -Following with GI, she has follow up in the spring/summer   -HIDA scan performed on 2023 showed normal gallbladder and gallbladder ducts with 54% ejection fraction  -Prevacid 30 mg daily, she is tapering off this  -she states that her abdominal pain has been pretty good, she states she has noticed her symptoms will get worse with dairy, so she has tried to cut this out of her diet    Vertigo  -Meclizine, she has not had to take this, she states as soon as she started PT that her dizziness has gotten much better  -Last visit we recommended reducing her salt intake as she also complained of some tinnitus    Vitamin D deficiency  -Vitamin D 1000 units daily    Chronic back pain/neck pain with cervical spinal stenosis and spondylolysis of the cervical region  -Following with orthopedic surgery, last visit was 2023  -Ortho states there is nothing surgically addressable in the cervical spine and that they cannot explain her symptoms based on cervical etiology and recommended a new MRI scan with referral to neurology  -Flexeril 10 mg 3 times daily as needed    Anxiety  -Last time recommended therapy  -she states she is actually doing a lot better, she has not sought out therapy yet     Health maintenance  -Mammogram scheduled for 3/29/2023  -Last Pap smear by Dr. Colin Romeo was 2022; she is with [facility] in [place]     Today:   No new complaints. Review of Systems   Constitutional: Negative. HENT:  Positive for tinnitus (improved). Eyes: Negative. Respiratory: Negative. Cardiovascular: Negative. Gastrointestinal: Negative. Endocrine: Negative. Genitourinary: Negative. Musculoskeletal:  Positive for neck pain. Skin: Negative. Neurological:  Positive for dizziness. Psychiatric/Behavioral: Negative. Past Medical History:   Diagnosis Date    Allergic rhinitis 10/24/2016    Anxiety     Bilateral ovarian cysts 2020    Chronic superficial gastritis without bleeding 2016 - EGD - gastritis moderate gastritis in the body -biopsied. Dr. Josephine Chaudhary     Gastroesophageal reflux disease 10/24/2016    Hiatal hernia     per pt report found as a child    Intraductal papilloma of breast, right 2021    Spondylosis of cervical region without myelopathy or radiculopathy 2020    MRI cervical spine  - POA showed some degenerative disc disease and osteophytes with mild/moderate spinal stenosis     Social History     Socioeconomic History    Marital status:      Spouse name: Not on file    Number of children: Not on file    Years of education: Not on file    Highest education level: Not on file   Occupational History    Not on file   Tobacco Use    Smoking status: Former     Packs/day: 1.00     Types: Cigarettes     Start date: 1998     Quit date: 2010     Years since quittin.2    Smokeless tobacco: Never   Substance and Sexual Activity    Alcohol use: Yes    Drug use: No    Sexual activity: Not on file   Other Topics Concern    Not on file   Social History Narrative    Lives with  and children  and      Social Determinants of Health     Financial Resource Strain: Low Risk     Difficulty of Paying Living Expenses: Not hard at all   Food Insecurity: No Food Insecurity    Worried About Running Out of Food in the Last Year: Never true    Ran Out of Food in the Last Year: Never true   Transportation Needs: Unknown    Lack of Transportation (Medical):  Not on file Lack of Transportation (Non-Medical): No   Physical Activity: Insufficiently Active    Days of Exercise per Week: 3 days    Minutes of Exercise per Session: 20 min   Stress: Not on file   Social Connections: Not on file   Intimate Partner Violence: Not At Risk    Fear of Current or Ex-Partner: No    Emotionally Abused: No    Physically Abused: No    Sexually Abused: No   Housing Stability: Unknown    Unable to Pay for Housing in the Last Year: Not on file    Number of Places Lived in the Last Year: Not on file    Unstable Housing in the Last Year: No         Current Outpatient Medications   Medication Sig    lansoprazole (PREVACID) 30 MG delayed release capsule Take 30 mg by mouth daily    cyanocobalamin 1000 MCG tablet 1,000 mcg    B COMPLEX-C PO Take 1 tablet by mouth daily    magnesium oxide (MAG-OX) 400 MG tablet Take 400 mg by mouth daily    acetaminophen (TYLENOL) 325 MG tablet Take by mouth every 4 hours as needed    vitamin D (CHOLECALCIFEROL) 25 MCG (1000 UT) TABS tablet Take by mouth daily    cyclobenzaprine (FLEXERIL) 10 MG tablet Take 10 mg by mouth 3 times daily as needed    Digestive Enzymes (PAPAYA AND ENZYMES PO) Take by mouth (Patient not taking: No sig reported)     No current facility-administered medications for this visit. Objective     Vitals:    03/17/23 1141   BP: 113/78   Site: Right Upper Arm   Position: Sitting   Cuff Size: Large Adult   Pulse: 82   Temp: 98.3 °F (36.8 °C)   TempSrc: Temporal   SpO2: 99%   Weight: 125 lb 12.8 oz (57.1 kg)   Height: 5' 1\" (1.549 m)      Physical Exam:  Constitutional: Appears well kempt. Alert/oriented x3. In no acute distress. Head: Normocephalic No trauma. No deformity. Cardiac: Heart with normal rate/rhythm. No murmurs. No gallops. Pulses normal.   Pulmonary: Lungs clear to auscultation bilaterally. In no respiratory distress. No wheezing. No rales. No rhonci. Psychiatric: Normal thought content. Normal behavior. Normal judgment. Recommendations     Assessment:  Patient Active Problem List   Diagnosis    Gastroesophageal reflux disease without esophagitis    Spondylosis of cervical region without myelopathy or radiculopathy    Elevated liver enzymes    Vertigo    Right upper quadrant pain    Cervical spinal stenosis      Status of above conditions: stable     Plan:  Acid reflux with chronic abdominal pain and right upper quadrant pain  -Follow-up with GI as needed  -Continue tapering off Prevacid 30 mg as outlined by GI    Vertigo  -Patient has not needed meclizine and states her dizziness is much better after PT, continue PT and meclizine as needed    Vitamin D deficiency  -Vitamin D 1000 units daily    Chronic back pain/neck pain with cervical spinal stenosis and spondylolysis of the cervical region  -Follow-up as needed with orthopedic surgery  -Continue PT    Anxiety  -Well-controlled, will continue to follow    Health maintenance  -Mammogram scheduled for 3/29/2023  -Last Pap smear by Dr. Sima Diallo was February 2022; she is with [facility] in [place]     -Previous medical records and/or labs/tests available to me reviewed, any records outstanding not available requested  -The risks, benefits, and medical necessity of all medications, tests labs, and any other orders that were ordered at today's visit were discussed with the patient including all elective or patient requested orders. Decision to order or not order tests or based on this risk/benefit ratio and medical necessity.  -The patient expresses understanding of the plan as I've explained it to her and is in agreement with the current plan.     Follow Up: 1 year    Total Time: 30 minutes (chart reviewing and in-exam time)    Signed: Elier Melissa D.O.  03/17/23  11:57 AM

## 2023-04-19 ENCOUNTER — HOSPITAL ENCOUNTER (OUTPATIENT)
Dept: MAMMOGRAPHY | Age: 43
Discharge: HOME | End: 2023-04-22
Payer: COMMERCIAL

## 2023-04-19 DIAGNOSIS — R92.8 ABNORMAL SCREENING MAMMOGRAM: ICD-10-CM

## 2023-04-19 PROCEDURE — 77065 DX MAMMO INCL CAD UNI: CPT

## 2024-06-06 ENCOUNTER — HOSPITAL ENCOUNTER (OUTPATIENT)
Dept: MAMMOGRAPHY | Age: 44
Discharge: HOME | End: 2024-06-06
Payer: COMMERCIAL

## 2024-06-06 VITALS — HEIGHT: 62 IN | BODY MASS INDEX: 23.92 KG/M2 | WEIGHT: 130 LBS

## 2024-06-06 DIAGNOSIS — Z12.31 VISIT FOR SCREENING MAMMOGRAM: ICD-10-CM

## 2024-06-06 PROCEDURE — 77063 BREAST TOMOSYNTHESIS BI: CPT

## 2024-07-02 ENCOUNTER — HOSPITAL ENCOUNTER (OUTPATIENT)
Dept: MAMMOGRAPHY | Age: 44
Discharge: HOME | End: 2024-07-05
Payer: COMMERCIAL

## 2024-07-02 DIAGNOSIS — R92.8 ABNORMAL SCREENING MAMMOGRAM: ICD-10-CM

## 2024-07-02 PROCEDURE — 76642 ULTRASOUND BREAST LIMITED: CPT

## 2024-07-02 PROCEDURE — G0279 TOMOSYNTHESIS, MAMMO: HCPCS

## 2024-07-20 ENCOUNTER — HOSPITAL ENCOUNTER (EMERGENCY)
Age: 44
Discharge: HOME | End: 2024-07-21
Payer: COMMERCIAL

## 2024-07-20 DIAGNOSIS — Z91.038 ALLERGIC REACTION TO INSECT BITE: Primary | ICD-10-CM

## 2024-07-20 PROCEDURE — 99284 EMERGENCY DEPT VISIT MOD MDM: CPT

## 2024-07-20 PROCEDURE — 93005 ELECTROCARDIOGRAM TRACING: CPT | Performed by: EMERGENCY MEDICINE

## 2024-07-20 PROCEDURE — 6360000002 HC RX W HCPCS: Performed by: NURSE PRACTITIONER

## 2024-07-20 PROCEDURE — 96361 HYDRATE IV INFUSION ADD-ON: CPT

## 2024-07-20 PROCEDURE — 2500000003 HC RX 250 WO HCPCS: Performed by: NURSE PRACTITIONER

## 2024-07-20 PROCEDURE — 96375 TX/PRO/DX INJ NEW DRUG ADDON: CPT

## 2024-07-20 PROCEDURE — 96376 TX/PRO/DX INJ SAME DRUG ADON: CPT

## 2024-07-20 PROCEDURE — 2580000003 HC RX 258: Performed by: NURSE PRACTITIONER

## 2024-07-20 PROCEDURE — 96374 THER/PROPH/DIAG INJ IV PUSH: CPT

## 2024-07-20 RX ORDER — PREDNISONE 20 MG/1
40 TABLET ORAL DAILY
Qty: 6 TABLET | Refills: 1 | Status: SHIPPED | OUTPATIENT
Start: 2024-07-20 | End: 2024-07-23

## 2024-07-20 RX ORDER — 0.9 % SODIUM CHLORIDE 0.9 %
1000 INTRAVENOUS SOLUTION INTRAVENOUS
Status: COMPLETED | OUTPATIENT
Start: 2024-07-20 | End: 2024-07-20

## 2024-07-20 RX ADMIN — FAMOTIDINE 20 MG: 10 INJECTION, SOLUTION INTRAVENOUS at 23:51

## 2024-07-20 RX ADMIN — FAMOTIDINE 20 MG: 10 INJECTION, SOLUTION INTRAVENOUS at 22:22

## 2024-07-20 RX ADMIN — SODIUM CHLORIDE 1000 ML: 9 INJECTION, SOLUTION INTRAVENOUS at 22:21

## 2024-07-20 RX ADMIN — WATER 125 MG: 1 INJECTION INTRAMUSCULAR; INTRAVENOUS; SUBCUTANEOUS at 22:22

## 2024-07-20 ASSESSMENT — ENCOUNTER SYMPTOMS
ABDOMINAL PAIN: 0
BACK PAIN: 0
SHORTNESS OF BREATH: 0
DIARRHEA: 0
NAUSEA: 0
EYES NEGATIVE: 1
FACIAL SWELLING: 1

## 2024-07-20 ASSESSMENT — PAIN - FUNCTIONAL ASSESSMENT: PAIN_FUNCTIONAL_ASSESSMENT: NONE - DENIES PAIN

## 2024-07-20 ASSESSMENT — LIFESTYLE VARIABLES
HOW MANY STANDARD DRINKS CONTAINING ALCOHOL DO YOU HAVE ON A TYPICAL DAY: 1 OR 2
HOW OFTEN DO YOU HAVE A DRINK CONTAINING ALCOHOL: MONTHLY OR LESS

## 2024-07-21 VITALS
HEIGHT: 62 IN | BODY MASS INDEX: 23.92 KG/M2 | DIASTOLIC BLOOD PRESSURE: 74 MMHG | HEART RATE: 90 BPM | WEIGHT: 130 LBS | TEMPERATURE: 98.5 F | RESPIRATION RATE: 18 BRPM | OXYGEN SATURATION: 99 % | SYSTOLIC BLOOD PRESSURE: 110 MMHG

## 2024-07-21 LAB
EKG ATRIAL RATE: 91 BPM
EKG DIAGNOSIS: NORMAL
EKG P AXIS: 83 DEGREES
EKG P-R INTERVAL: 129 MS
EKG Q-T INTERVAL: 346 MS
EKG QRS DURATION: 59 MS
EKG QTC CALCULATION (BAZETT): 424 MS
EKG R AXIS: 77 DEGREES
EKG T AXIS: 62 DEGREES
EKG VENTRICULAR RATE: 90 BPM

## 2024-07-21 PROCEDURE — 93010 ELECTROCARDIOGRAM REPORT: CPT | Performed by: INTERNAL MEDICINE

## 2024-07-21 ASSESSMENT — PAIN - FUNCTIONAL ASSESSMENT: PAIN_FUNCTIONAL_ASSESSMENT: 0-10

## 2024-07-21 ASSESSMENT — PAIN SCALES - GENERAL: PAINLEVEL_OUTOF10: 0

## 2024-07-21 NOTE — ED TRIAGE NOTES
Pt presents to triage with co allergic reaction to ant bite.  Pt states she was at the pool when she was bitten by an ant.  Pt is flushed and lips are swelling in triage.  States her chest hurts and she is restless in triage.  Pt states she took two OTC benadryl PTA.      Redness to extremities and groin.  Pt states she was bit on the foot.

## 2024-07-21 NOTE — DISCHARGE INSTRUCTIONS
Take benadryl 50mg every 6 hours for 24 hours replaced by zyrtec 10mg daily for 2 additional days. Use pepcid 40mg daily for 3 days. Prednisone 40mg daily for 3 days. Have epi pen present in medicine cabinet. Return to ED for new or worsening symptoms

## 2024-07-21 NOTE — ED PROVIDER NOTES
LANSOPRAZOLE (PREVACID) 30 MG DELAYED RELEASE CAPSULE    Take 30 mg by mouth daily    MAGNESIUM OXIDE (MAG-OX) 400 MG TABLET    Take 400 mg by mouth daily    VITAMIN D (CHOLECALCIFEROL) 25 MCG (1000 UT) TABS TABLET    Take by mouth daily        No results found for any visits on 07/20/24.      No orders to display                No results for input(s): \"COVID19\" in the last 72 hours.    Voice dictation software was used during the making of this note.  This software is not perfect and grammatical and other typographical errors may be present.  This note has not been completely proofread for errors.        Rosetta Smith, APRN - NP  07/20/24 0184

## 2025-06-30 ENCOUNTER — HOSPITAL ENCOUNTER (OUTPATIENT)
Dept: MAMMOGRAPHY | Age: 45
Discharge: HOME | End: 2025-07-03
Payer: COMMERCIAL

## 2025-06-30 DIAGNOSIS — Z12.31 SCREENING MAMMOGRAM FOR BREAST CANCER: ICD-10-CM

## 2025-06-30 PROCEDURE — 77063 BREAST TOMOSYNTHESIS BI: CPT

## 2025-07-01 ENCOUNTER — RESULTS FOLLOW-UP (OUTPATIENT)
Dept: INTERNAL MEDICINE CLINIC | Facility: CLINIC | Age: 45
End: 2025-07-01

## (undated) DEVICE — SUTURE VCRL SZ 2-0 L36IN ABSRB UD L36MM CT-1 1/2 CIR J945H

## (undated) DEVICE — NEEDLE HYPO 21GA L1.5IN INTRAMUSCULAR S STL LATCH BVL UP

## (undated) DEVICE — APPLIER CLP L9.38IN M LIG TI DISP STR RNG HNDL LIGACLP

## (undated) DEVICE — WIRE CUTTER, STERILE (WCS144): Brand: CENTURION MEDICAL PRODUCTS CORP

## (undated) DEVICE — YANKAUER,BULB TIP,W/O VENT,RIGID,STERILE: Brand: MEDLINE

## (undated) DEVICE — GARMENT,MEDLINE,DVT,INT,CALF,MED, GEN2: Brand: MEDLINE

## (undated) DEVICE — SOLUTION IV 1000ML 0.9% SOD CHL

## (undated) DEVICE — GOWN,REINFORCED,POLY,AURORA,XXLARGE,STR: Brand: MEDLINE

## (undated) DEVICE — SUTURE VCRL SZ 3-0 L27IN ABSRB UD L26MM SH 1/2 CIR J416H

## (undated) DEVICE — SUTURE MCRYL SZ 3-0 L27IN ABSRB UD L19MM PS-2 3/8 CIR PRIM Y427H

## (undated) DEVICE — SUT SLK 2-0SH 30IN BLK --

## (undated) DEVICE — SURGICAL PROCEDURE PACK BASIC ST FRANCIS

## (undated) DEVICE — PAD,ABDOMINAL,5"X9",ST,LF,25/BX: Brand: MEDLINE INDUSTRIES, INC.

## (undated) DEVICE — SHEET, DRAPE, SPLIT, STERILE: Brand: MEDLINE

## (undated) DEVICE — CONTAINER SPEC HISTOLOGY 900ML POLYPR

## (undated) DEVICE — DRAPE,TOP,102X53,STERILE: Brand: MEDLINE

## (undated) DEVICE — NEPTUNE E-SEP SMOKE EVACUATION PENCIL, COATED, 70MM BLADE, PUSH BUTTON SWITCH: Brand: NEPTUNE E-SEP

## (undated) DEVICE — REM POLYHESIVE ADULT PATIENT RETURN ELECTRODE: Brand: VALLEYLAB

## (undated) DEVICE — CONTAINER COLL/TRNSPRT BX BRST -- E-Z-EM CAT 8390

## (undated) DEVICE — MINOR SPLIT GENERAL: Brand: MEDLINE INDUSTRIES, INC.

## (undated) DEVICE — (D)PREP SKN CHLRAPRP APPL 26ML -- CONVERT TO ITEM 371833

## (undated) DEVICE — 2000CC GUARDIAN II: Brand: GUARDIAN